# Patient Record
Sex: MALE | Race: WHITE | NOT HISPANIC OR LATINO | Employment: OTHER
[De-identification: names, ages, dates, MRNs, and addresses within clinical notes are randomized per-mention and may not be internally consistent; named-entity substitution may affect disease eponyms.]

---

## 2017-02-22 ENCOUNTER — RECORDS - HEALTHEAST (OUTPATIENT)
Dept: ADMINISTRATIVE | Facility: OTHER | Age: 67
End: 2017-02-22

## 2017-02-23 ENCOUNTER — RECORDS - HEALTHEAST (OUTPATIENT)
Dept: ADMINISTRATIVE | Facility: OTHER | Age: 67
End: 2017-02-23

## 2017-03-29 ENCOUNTER — COMMUNICATION - HEALTHEAST (OUTPATIENT)
Dept: INTERNAL MEDICINE | Facility: CLINIC | Age: 67
End: 2017-03-29

## 2017-03-29 ENCOUNTER — OFFICE VISIT - HEALTHEAST (OUTPATIENT)
Dept: INTERNAL MEDICINE | Facility: CLINIC | Age: 67
End: 2017-03-29

## 2017-03-29 DIAGNOSIS — I10 BENIGN ESSENTIAL HTN: ICD-10-CM

## 2017-03-29 DIAGNOSIS — Z12.5 PROSTATE CANCER SCREENING: ICD-10-CM

## 2017-03-29 DIAGNOSIS — E78.5 HYPERLIPEMIA: ICD-10-CM

## 2017-03-29 DIAGNOSIS — Z00.00 HEALTHCARE MAINTENANCE: ICD-10-CM

## 2017-03-29 LAB
CHOLEST SERPL-MCNC: 255 MG/DL
FASTING STATUS PATIENT QL REPORTED: YES
HDLC SERPL-MCNC: 58 MG/DL
LDLC SERPL CALC-MCNC: 186 MG/DL
PSA SERPL-MCNC: 2.6 NG/ML (ref 0–4.5)
TRIGL SERPL-MCNC: 57 MG/DL

## 2017-03-29 ASSESSMENT — MIFFLIN-ST. JEOR: SCORE: 1765.39

## 2017-04-24 ENCOUNTER — COMMUNICATION - HEALTHEAST (OUTPATIENT)
Dept: SCHEDULING | Facility: CLINIC | Age: 67
End: 2017-04-24

## 2017-04-24 DIAGNOSIS — I10 UNSPECIFIED ESSENTIAL HYPERTENSION: ICD-10-CM

## 2017-09-01 ENCOUNTER — COMMUNICATION - HEALTHEAST (OUTPATIENT)
Dept: INTERNAL MEDICINE | Facility: CLINIC | Age: 67
End: 2017-09-01

## 2017-09-01 DIAGNOSIS — I10 HTN (HYPERTENSION): ICD-10-CM

## 2017-09-01 DIAGNOSIS — I10 UNSPECIFIED ESSENTIAL HYPERTENSION: ICD-10-CM

## 2017-12-21 ENCOUNTER — RECORDS - HEALTHEAST (OUTPATIENT)
Dept: ADMINISTRATIVE | Facility: OTHER | Age: 67
End: 2017-12-21

## 2018-03-29 ENCOUNTER — COMMUNICATION - HEALTHEAST (OUTPATIENT)
Dept: INTERNAL MEDICINE | Facility: CLINIC | Age: 68
End: 2018-03-29

## 2018-03-29 DIAGNOSIS — I10 ESSENTIAL HYPERTENSION: ICD-10-CM

## 2018-03-29 DIAGNOSIS — I10 HTN (HYPERTENSION): ICD-10-CM

## 2018-04-25 ENCOUNTER — COMMUNICATION - HEALTHEAST (OUTPATIENT)
Dept: INTERNAL MEDICINE | Facility: CLINIC | Age: 68
End: 2018-04-25

## 2018-04-25 ENCOUNTER — OFFICE VISIT - HEALTHEAST (OUTPATIENT)
Dept: INTERNAL MEDICINE | Facility: CLINIC | Age: 68
End: 2018-04-25

## 2018-04-25 DIAGNOSIS — E78.5 HYPERLIPEMIA: ICD-10-CM

## 2018-04-25 DIAGNOSIS — Z00.00 HEALTHCARE MAINTENANCE: ICD-10-CM

## 2018-04-25 DIAGNOSIS — Z82.49 FH: HEART DISEASE: ICD-10-CM

## 2018-04-25 DIAGNOSIS — E55.9 VITAMIN D DEFICIENCY: ICD-10-CM

## 2018-04-25 DIAGNOSIS — Z12.5 PROSTATE CANCER SCREENING: ICD-10-CM

## 2018-04-25 DIAGNOSIS — I10 BENIGN ESSENTIAL HTN: ICD-10-CM

## 2018-04-25 LAB
25(OH)D3 SERPL-MCNC: 67.1 NG/ML (ref 30–80)
ALBUMIN SERPL-MCNC: 3.8 G/DL (ref 3.5–5)
ALP SERPL-CCNC: 77 U/L (ref 45–120)
ALT SERPL W P-5'-P-CCNC: 29 U/L (ref 0–45)
ANION GAP SERPL CALCULATED.3IONS-SCNC: 12 MMOL/L (ref 5–18)
AST SERPL W P-5'-P-CCNC: 23 U/L (ref 0–40)
BILIRUB SERPL-MCNC: 0.6 MG/DL (ref 0–1)
BUN SERPL-MCNC: 30 MG/DL (ref 8–22)
CALCIUM SERPL-MCNC: 9.9 MG/DL (ref 8.5–10.5)
CHLORIDE BLD-SCNC: 104 MMOL/L (ref 98–107)
CHOLEST SERPL-MCNC: 283 MG/DL
CO2 SERPL-SCNC: 25 MMOL/L (ref 22–31)
CREAT SERPL-MCNC: 1.41 MG/DL (ref 0.7–1.3)
ERYTHROCYTE [DISTWIDTH] IN BLOOD BY AUTOMATED COUNT: 12.5 % (ref 11–14.5)
FASTING STATUS PATIENT QL REPORTED: YES
GFR SERPL CREATININE-BSD FRML MDRD: 50 ML/MIN/1.73M2
GLUCOSE BLD-MCNC: 88 MG/DL (ref 70–125)
HCT VFR BLD AUTO: 43.6 % (ref 40–54)
HDLC SERPL-MCNC: 61 MG/DL
HGB BLD-MCNC: 14.9 G/DL (ref 14–18)
LDLC SERPL CALC-MCNC: 212 MG/DL
MCH RBC QN AUTO: 31.1 PG (ref 27–34)
MCHC RBC AUTO-ENTMCNC: 34.1 G/DL (ref 32–36)
MCV RBC AUTO: 91 FL (ref 80–100)
PLATELET # BLD AUTO: 192 THOU/UL (ref 140–440)
PMV BLD AUTO: 9.1 FL (ref 7–10)
POTASSIUM BLD-SCNC: 4.3 MMOL/L (ref 3.5–5)
PROT SERPL-MCNC: 7.2 G/DL (ref 6–8)
PSA SERPL-MCNC: 3.4 NG/ML (ref 0–4.5)
RBC # BLD AUTO: 4.78 MILL/UL (ref 4.4–6.2)
SODIUM SERPL-SCNC: 141 MMOL/L (ref 136–145)
TRIGL SERPL-MCNC: 48 MG/DL
TSH SERPL DL<=0.005 MIU/L-ACNC: 1.58 UIU/ML (ref 0.3–5)
WBC: 6.7 THOU/UL (ref 4–11)

## 2018-04-25 ASSESSMENT — MIFFLIN-ST. JEOR: SCORE: 1743.96

## 2018-05-03 ENCOUNTER — HOSPITAL ENCOUNTER (OUTPATIENT)
Dept: CT IMAGING | Facility: CLINIC | Age: 68
Discharge: HOME | End: 2018-05-03
Attending: INTERNAL MEDICINE

## 2018-05-03 ENCOUNTER — COMMUNICATION - HEALTHEAST (OUTPATIENT)
Dept: INTERNAL MEDICINE | Facility: CLINIC | Age: 68
End: 2018-05-03

## 2018-05-03 DIAGNOSIS — E78.5 HYPERLIPEMIA: ICD-10-CM

## 2018-05-03 DIAGNOSIS — Z82.49 FH: HEART DISEASE: ICD-10-CM

## 2018-05-03 LAB
CV CALCIUM SCORE AGATSTON LM: 1
CV CALCIUM SCORING AGATSON LAD: 0
CV CALCIUM SCORING AGATSTON CX: 0
CV CALCIUM SCORING AGATSTON RCA: 0
CV CALCIUM SCORING AGATSTON TOTAL: 1

## 2018-05-04 ENCOUNTER — COMMUNICATION - HEALTHEAST (OUTPATIENT)
Dept: INTERNAL MEDICINE | Facility: CLINIC | Age: 68
End: 2018-05-04

## 2018-08-14 ENCOUNTER — COMMUNICATION - HEALTHEAST (OUTPATIENT)
Dept: INTERNAL MEDICINE | Facility: CLINIC | Age: 68
End: 2018-08-14

## 2018-08-14 DIAGNOSIS — I10 ESSENTIAL HYPERTENSION: ICD-10-CM

## 2018-08-15 ENCOUNTER — COMMUNICATION - HEALTHEAST (OUTPATIENT)
Dept: INTERNAL MEDICINE | Facility: CLINIC | Age: 68
End: 2018-08-15

## 2018-08-15 DIAGNOSIS — I10 HTN (HYPERTENSION): ICD-10-CM

## 2018-11-20 ENCOUNTER — COMMUNICATION - HEALTHEAST (OUTPATIENT)
Dept: INTERNAL MEDICINE | Facility: CLINIC | Age: 68
End: 2018-11-20

## 2018-11-20 DIAGNOSIS — I10 ESSENTIAL HYPERTENSION: ICD-10-CM

## 2018-11-20 DIAGNOSIS — I10 HTN (HYPERTENSION): ICD-10-CM

## 2019-01-17 ENCOUNTER — OFFICE VISIT - HEALTHEAST (OUTPATIENT)
Dept: INTERNAL MEDICINE | Facility: CLINIC | Age: 69
End: 2019-01-17

## 2019-01-17 DIAGNOSIS — E55.9 MILD VITAMIN D DEFICIENCY: ICD-10-CM

## 2019-01-17 DIAGNOSIS — Z00.00 HEALTHCARE MAINTENANCE: ICD-10-CM

## 2019-01-17 DIAGNOSIS — Z12.5 SCREENING FOR PROSTATE CANCER: ICD-10-CM

## 2019-01-17 DIAGNOSIS — E78.5 HYPERLIPIDEMIA, UNSPECIFIED HYPERLIPIDEMIA TYPE: ICD-10-CM

## 2019-01-17 LAB
ALBUMIN SERPL-MCNC: 3.8 G/DL (ref 3.5–5)
ALP SERPL-CCNC: 64 U/L (ref 45–120)
ALT SERPL W P-5'-P-CCNC: 24 U/L (ref 0–45)
ANION GAP SERPL CALCULATED.3IONS-SCNC: 10 MMOL/L (ref 5–18)
AST SERPL W P-5'-P-CCNC: 19 U/L (ref 0–40)
BILIRUB SERPL-MCNC: 1.1 MG/DL (ref 0–1)
BUN SERPL-MCNC: 33 MG/DL (ref 8–28)
CALCIUM SERPL-MCNC: 9.8 MG/DL (ref 8.5–10.5)
CHLORIDE BLD-SCNC: 104 MMOL/L (ref 98–107)
CHOLEST SERPL-MCNC: 267 MG/DL
CO2 SERPL-SCNC: 26 MMOL/L (ref 22–31)
CREAT SERPL-MCNC: 1.2 MG/DL (ref 0.7–1.3)
ERYTHROCYTE [DISTWIDTH] IN BLOOD BY AUTOMATED COUNT: 12.4 % (ref 11–14.5)
FASTING STATUS PATIENT QL REPORTED: YES
GFR SERPL CREATININE-BSD FRML MDRD: 60 ML/MIN/1.73M2
GLUCOSE BLD-MCNC: 93 MG/DL (ref 70–125)
HCT VFR BLD AUTO: 43.4 % (ref 40–54)
HDLC SERPL-MCNC: 61 MG/DL
HGB BLD-MCNC: 14.5 G/DL (ref 14–18)
LDLC SERPL CALC-MCNC: 194 MG/DL
MCH RBC QN AUTO: 30.4 PG (ref 27–34)
MCHC RBC AUTO-ENTMCNC: 33.5 G/DL (ref 32–36)
MCV RBC AUTO: 91 FL (ref 80–100)
PLATELET # BLD AUTO: 200 THOU/UL (ref 140–440)
PMV BLD AUTO: 9.2 FL (ref 7–10)
POTASSIUM BLD-SCNC: 3.9 MMOL/L (ref 3.5–5)
PROT SERPL-MCNC: 6.8 G/DL (ref 6–8)
PSA SERPL-MCNC: 2.8 NG/ML (ref 0–6.5)
RBC # BLD AUTO: 4.79 MILL/UL (ref 4.4–6.2)
SODIUM SERPL-SCNC: 140 MMOL/L (ref 136–145)
TRIGL SERPL-MCNC: 59 MG/DL
TSH SERPL DL<=0.005 MIU/L-ACNC: 1.74 UIU/ML (ref 0.3–5)
WBC: 5.1 THOU/UL (ref 4–11)

## 2019-01-17 ASSESSMENT — MIFFLIN-ST. JEOR: SCORE: 1713.34

## 2019-01-18 ENCOUNTER — COMMUNICATION - HEALTHEAST (OUTPATIENT)
Dept: INTERNAL MEDICINE | Facility: CLINIC | Age: 69
End: 2019-01-18

## 2019-01-18 LAB — 25(OH)D3 SERPL-MCNC: 53.2 NG/ML (ref 30–80)

## 2019-02-05 ENCOUNTER — COMMUNICATION - HEALTHEAST (OUTPATIENT)
Dept: INTERNAL MEDICINE | Facility: CLINIC | Age: 69
End: 2019-02-05

## 2019-05-09 ENCOUNTER — COMMUNICATION - HEALTHEAST (OUTPATIENT)
Dept: INTERNAL MEDICINE | Facility: CLINIC | Age: 69
End: 2019-05-09

## 2019-05-09 DIAGNOSIS — I10 ESSENTIAL HYPERTENSION: ICD-10-CM

## 2019-07-31 ENCOUNTER — COMMUNICATION - HEALTHEAST (OUTPATIENT)
Dept: INTERNAL MEDICINE | Facility: CLINIC | Age: 69
End: 2019-07-31

## 2019-07-31 DIAGNOSIS — I10 HTN (HYPERTENSION): ICD-10-CM

## 2020-02-16 ENCOUNTER — COMMUNICATION - HEALTHEAST (OUTPATIENT)
Dept: INTERNAL MEDICINE | Facility: CLINIC | Age: 70
End: 2020-02-16

## 2020-02-16 DIAGNOSIS — I10 HTN (HYPERTENSION): ICD-10-CM

## 2020-03-02 ENCOUNTER — COMMUNICATION - HEALTHEAST (OUTPATIENT)
Dept: INTERNAL MEDICINE | Facility: CLINIC | Age: 70
End: 2020-03-02

## 2020-03-02 ENCOUNTER — OFFICE VISIT - HEALTHEAST (OUTPATIENT)
Dept: INTERNAL MEDICINE | Facility: CLINIC | Age: 70
End: 2020-03-02

## 2020-03-02 DIAGNOSIS — E78.5 HYPERLIPIDEMIA LDL GOAL <100: ICD-10-CM

## 2020-03-02 DIAGNOSIS — I10 HTN (HYPERTENSION): ICD-10-CM

## 2020-03-02 DIAGNOSIS — I10 ESSENTIAL HYPERTENSION: ICD-10-CM

## 2020-03-02 DIAGNOSIS — D12.6 ADENOMATOUS POLYP OF COLON, UNSPECIFIED PART OF COLON: ICD-10-CM

## 2020-03-02 DIAGNOSIS — Z12.5 SCREENING FOR PROSTATE CANCER: ICD-10-CM

## 2020-03-02 LAB
ALBUMIN SERPL-MCNC: 3.8 G/DL (ref 3.5–5)
ALP SERPL-CCNC: 74 U/L (ref 45–120)
ALT SERPL W P-5'-P-CCNC: 21 U/L (ref 0–45)
ANION GAP SERPL CALCULATED.3IONS-SCNC: 11 MMOL/L (ref 5–18)
AST SERPL W P-5'-P-CCNC: 17 U/L (ref 0–40)
BASOPHILS # BLD AUTO: 0 THOU/UL (ref 0–0.2)
BASOPHILS NFR BLD AUTO: 1 % (ref 0–2)
BILIRUB SERPL-MCNC: 0.7 MG/DL (ref 0–1)
BUN SERPL-MCNC: 25 MG/DL (ref 8–28)
CALCIUM SERPL-MCNC: 9.6 MG/DL (ref 8.5–10.5)
CHLORIDE BLD-SCNC: 104 MMOL/L (ref 98–107)
CHOLEST SERPL-MCNC: 295 MG/DL
CO2 SERPL-SCNC: 26 MMOL/L (ref 22–31)
CREAT SERPL-MCNC: 1.1 MG/DL (ref 0.7–1.3)
EOSINOPHIL # BLD AUTO: 0.1 THOU/UL (ref 0–0.4)
EOSINOPHIL NFR BLD AUTO: 2 % (ref 0–6)
ERYTHROCYTE [DISTWIDTH] IN BLOOD BY AUTOMATED COUNT: 13.3 % (ref 11–14.5)
FASTING STATUS PATIENT QL REPORTED: YES
GFR SERPL CREATININE-BSD FRML MDRD: >60 ML/MIN/1.73M2
GLUCOSE BLD-MCNC: 96 MG/DL (ref 70–125)
HCT VFR BLD AUTO: 42.1 % (ref 40–54)
HDLC SERPL-MCNC: 59 MG/DL
HGB BLD-MCNC: 14.2 G/DL (ref 14–18)
LDLC SERPL CALC-MCNC: 220 MG/DL
LYMPHOCYTES # BLD AUTO: 1.2 THOU/UL (ref 0.8–4.4)
LYMPHOCYTES NFR BLD AUTO: 24 % (ref 20–40)
MCH RBC QN AUTO: 30.3 PG (ref 27–34)
MCHC RBC AUTO-ENTMCNC: 33.7 G/DL (ref 32–36)
MCV RBC AUTO: 90 FL (ref 80–100)
MONOCYTES # BLD AUTO: 0.6 THOU/UL (ref 0–0.9)
MONOCYTES NFR BLD AUTO: 11 % (ref 2–10)
NEUTROPHILS # BLD AUTO: 3 THOU/UL (ref 2–7.7)
NEUTROPHILS NFR BLD AUTO: 62 % (ref 50–70)
PLATELET # BLD AUTO: 185 THOU/UL (ref 140–440)
PMV BLD AUTO: 12.3 FL (ref 8.5–12.5)
POTASSIUM BLD-SCNC: 4 MMOL/L (ref 3.5–5)
PROT SERPL-MCNC: 6.9 G/DL (ref 6–8)
PSA SERPL-MCNC: 2.7 NG/ML (ref 0–6.5)
RBC # BLD AUTO: 4.68 MILL/UL (ref 4.4–6.2)
SODIUM SERPL-SCNC: 141 MMOL/L (ref 136–145)
TRIGL SERPL-MCNC: 78 MG/DL
WBC: 5 THOU/UL (ref 4–11)

## 2020-03-06 ENCOUNTER — OFFICE VISIT - HEALTHEAST (OUTPATIENT)
Dept: PHARMACY | Facility: CLINIC | Age: 70
End: 2020-03-06

## 2020-03-06 DIAGNOSIS — I10 BENIGN ESSENTIAL HTN: ICD-10-CM

## 2020-03-06 DIAGNOSIS — E78.5 HYPERLIPIDEMIA, UNSPECIFIED HYPERLIPIDEMIA TYPE: ICD-10-CM

## 2020-03-06 DIAGNOSIS — Z78.9 TAKES DIETARY SUPPLEMENTS: ICD-10-CM

## 2020-03-06 RX ORDER — GLUCOSAMINE HCL 500 MG
100 TABLET ORAL DAILY
Status: SHIPPED | COMMUNITY
Start: 2020-03-06 | End: 2023-09-09

## 2020-03-06 RX ORDER — CHLORAL HYDRATE 500 MG
1 CAPSULE ORAL DAILY
Status: SHIPPED | COMMUNITY
Start: 2020-03-06 | End: 2023-09-09

## 2020-03-06 RX ORDER — LACTOBACILLUS RHAMNOSUS GG 15B CELL
1 CAPSULE, SPRINKLE ORAL DAILY
Status: SHIPPED | COMMUNITY
Start: 2020-03-06 | End: 2023-09-09

## 2020-03-06 RX ORDER — ZINC GLUCONATE 50 MG
50 TABLET ORAL DAILY
Status: SHIPPED | COMMUNITY
Start: 2020-03-06 | End: 2023-09-09

## 2020-03-11 ENCOUNTER — COMMUNICATION - HEALTHEAST (OUTPATIENT)
Dept: INTERNAL MEDICINE | Facility: CLINIC | Age: 70
End: 2020-03-11

## 2020-03-11 DIAGNOSIS — E78.5 HYPERLIPIDEMIA, UNSPECIFIED HYPERLIPIDEMIA TYPE: ICD-10-CM

## 2020-03-11 DIAGNOSIS — Z78.9 STATIN INTOLERANCE: ICD-10-CM

## 2020-03-11 DIAGNOSIS — I10 BENIGN ESSENTIAL HTN: ICD-10-CM

## 2020-03-23 ENCOUNTER — COMMUNICATION - HEALTHEAST (OUTPATIENT)
Dept: INTERNAL MEDICINE | Facility: CLINIC | Age: 70
End: 2020-03-23

## 2020-03-27 ENCOUNTER — COMMUNICATION - HEALTHEAST (OUTPATIENT)
Dept: NURSING | Facility: CLINIC | Age: 70
End: 2020-03-27

## 2020-05-14 ENCOUNTER — COMMUNICATION - HEALTHEAST (OUTPATIENT)
Dept: INTERNAL MEDICINE | Facility: CLINIC | Age: 70
End: 2020-05-14

## 2020-05-19 ENCOUNTER — COMMUNICATION - HEALTHEAST (OUTPATIENT)
Dept: INTERNAL MEDICINE | Facility: CLINIC | Age: 70
End: 2020-05-19

## 2020-05-19 DIAGNOSIS — I10 ESSENTIAL HYPERTENSION: ICD-10-CM

## 2020-05-20 RX ORDER — HYDROCHLOROTHIAZIDE 12.5 MG/1
CAPSULE ORAL
Qty: 90 CAPSULE | Refills: 3 | Status: SHIPPED | OUTPATIENT
Start: 2020-05-20

## 2020-07-07 ENCOUNTER — COMMUNICATION - HEALTHEAST (OUTPATIENT)
Dept: INTERNAL MEDICINE | Facility: CLINIC | Age: 70
End: 2020-07-07

## 2020-07-07 DIAGNOSIS — I10 HTN (HYPERTENSION): ICD-10-CM

## 2020-07-07 DIAGNOSIS — I10 BENIGN ESSENTIAL HTN: ICD-10-CM

## 2020-07-07 RX ORDER — VALSARTAN 160 MG/1
160 TABLET ORAL DAILY
Qty: 30 TABLET | Refills: 11 | Status: SHIPPED | OUTPATIENT
Start: 2020-07-07 | End: 2023-09-09

## 2020-09-09 ENCOUNTER — COMMUNICATION - HEALTHEAST (OUTPATIENT)
Dept: INTERNAL MEDICINE | Facility: CLINIC | Age: 70
End: 2020-09-09

## 2020-09-09 DIAGNOSIS — M54.30 SCIATICA, UNSPECIFIED LATERALITY: ICD-10-CM

## 2020-09-14 ENCOUNTER — OFFICE VISIT - HEALTHEAST (OUTPATIENT)
Dept: INTERNAL MEDICINE | Facility: CLINIC | Age: 70
End: 2020-09-14

## 2020-09-14 DIAGNOSIS — M54.16 LUMBAR RADICULOPATHY: ICD-10-CM

## 2020-09-14 DIAGNOSIS — I10 BENIGN ESSENTIAL HTN: ICD-10-CM

## 2020-09-14 DIAGNOSIS — I77.89 OTHER SPECIFIED DISORDERS OF ARTERIES AND ARTERIOLES (H): ICD-10-CM

## 2020-09-14 DIAGNOSIS — E78.5 HYPERLIPIDEMIA, UNSPECIFIED HYPERLIPIDEMIA TYPE: ICD-10-CM

## 2020-09-14 LAB
ANION GAP SERPL CALCULATED.3IONS-SCNC: 8 MMOL/L (ref 5–18)
BASOPHILS # BLD AUTO: 0 THOU/UL (ref 0–0.2)
BASOPHILS NFR BLD AUTO: 1 % (ref 0–2)
BUN SERPL-MCNC: 21 MG/DL (ref 8–28)
CALCIUM SERPL-MCNC: 9.4 MG/DL (ref 8.5–10.5)
CHLORIDE BLD-SCNC: 103 MMOL/L (ref 98–107)
CHOLEST SERPL-MCNC: 185 MG/DL
CO2 SERPL-SCNC: 29 MMOL/L (ref 22–31)
CREAT SERPL-MCNC: 1.04 MG/DL (ref 0.7–1.3)
EOSINOPHIL # BLD AUTO: 0.2 THOU/UL (ref 0–0.4)
EOSINOPHIL NFR BLD AUTO: 3 % (ref 0–6)
ERYTHROCYTE [DISTWIDTH] IN BLOOD BY AUTOMATED COUNT: 13 % (ref 11–14.5)
FASTING STATUS PATIENT QL REPORTED: YES
GFR SERPL CREATININE-BSD FRML MDRD: >60 ML/MIN/1.73M2
GLUCOSE BLD-MCNC: 93 MG/DL (ref 70–125)
HCT VFR BLD AUTO: 43 % (ref 40–54)
HDLC SERPL-MCNC: 51 MG/DL
HGB BLD-MCNC: 14.5 G/DL (ref 14–18)
LDLC SERPL CALC-MCNC: 120 MG/DL
LYMPHOCYTES # BLD AUTO: 1.5 THOU/UL (ref 0.8–4.4)
LYMPHOCYTES NFR BLD AUTO: 28 % (ref 20–40)
MCH RBC QN AUTO: 31 PG (ref 27–34)
MCHC RBC AUTO-ENTMCNC: 33.6 G/DL (ref 32–36)
MCV RBC AUTO: 92 FL (ref 80–100)
MONOCYTES # BLD AUTO: 0.5 THOU/UL (ref 0–0.9)
MONOCYTES NFR BLD AUTO: 9 % (ref 2–10)
NEUTROPHILS # BLD AUTO: 3.2 THOU/UL (ref 2–7.7)
NEUTROPHILS NFR BLD AUTO: 60 % (ref 50–70)
PLATELET # BLD AUTO: 193 THOU/UL (ref 140–440)
PMV BLD AUTO: 9.3 FL (ref 7–10)
POTASSIUM BLD-SCNC: 3.6 MMOL/L (ref 3.5–5)
RBC # BLD AUTO: 4.67 MILL/UL (ref 4.4–6.2)
SODIUM SERPL-SCNC: 140 MMOL/L (ref 136–145)
TRIGL SERPL-MCNC: 68 MG/DL
WBC: 5.3 THOU/UL (ref 4–11)

## 2020-09-14 RX ORDER — AMLODIPINE BESYLATE 10 MG/1
10 TABLET ORAL DAILY
Qty: 90 TABLET | Refills: 3 | Status: SHIPPED | OUTPATIENT
Start: 2020-09-14 | End: 2023-09-09

## 2020-09-14 ASSESSMENT — MIFFLIN-ST. JEOR: SCORE: 1763.23

## 2020-09-15 ENCOUNTER — COMMUNICATION - HEALTHEAST (OUTPATIENT)
Dept: INTERNAL MEDICINE | Facility: CLINIC | Age: 70
End: 2020-09-15

## 2020-09-30 ENCOUNTER — HOSPITAL ENCOUNTER (OUTPATIENT)
Dept: MRI IMAGING | Facility: CLINIC | Age: 70
Discharge: HOME | End: 2020-09-30
Attending: INTERNAL MEDICINE

## 2020-09-30 DIAGNOSIS — M54.16 LUMBAR RADICULOPATHY: ICD-10-CM

## 2020-10-01 ENCOUNTER — COMMUNICATION - HEALTHEAST (OUTPATIENT)
Dept: INTERNAL MEDICINE | Facility: CLINIC | Age: 70
End: 2020-10-01

## 2020-10-01 DIAGNOSIS — M48.062 SPINAL STENOSIS OF LUMBAR REGION WITH NEUROGENIC CLAUDICATION: ICD-10-CM

## 2020-10-05 ENCOUNTER — HOSPITAL ENCOUNTER (OUTPATIENT)
Dept: PHYSICAL MEDICINE AND REHAB | Facility: CLINIC | Age: 70
Discharge: HOME | End: 2020-10-05
Attending: INTERNAL MEDICINE

## 2020-10-05 DIAGNOSIS — M43.17 SPONDYLOLISTHESIS OF LUMBOSACRAL REGION: ICD-10-CM

## 2020-10-05 DIAGNOSIS — M54.16 LUMBAR RADICULAR PAIN: ICD-10-CM

## 2020-10-05 ASSESSMENT — MIFFLIN-ST. JEOR: SCORE: 1763.23

## 2020-10-14 ENCOUNTER — OFFICE VISIT - HEALTHEAST (OUTPATIENT)
Dept: PHYSICAL THERAPY | Facility: REHABILITATION | Age: 70
End: 2020-10-14

## 2020-10-14 DIAGNOSIS — M54.41 CHRONIC RIGHT-SIDED LOW BACK PAIN WITH RIGHT-SIDED SCIATICA: ICD-10-CM

## 2020-10-14 DIAGNOSIS — G89.29 CHRONIC RIGHT-SIDED LOW BACK PAIN WITH RIGHT-SIDED SCIATICA: ICD-10-CM

## 2020-10-14 DIAGNOSIS — M62.89 MUSCLE TIGHTNESS: ICD-10-CM

## 2020-10-14 DIAGNOSIS — M62.81 GENERALIZED MUSCLE WEAKNESS: ICD-10-CM

## 2020-10-19 ENCOUNTER — HOSPITAL ENCOUNTER (OUTPATIENT)
Dept: PHYSICAL MEDICINE AND REHAB | Facility: CLINIC | Age: 70
Discharge: HOME | End: 2020-10-19
Attending: PHYSICIAN ASSISTANT

## 2020-10-19 DIAGNOSIS — M54.16 LUMBAR RADICULAR PAIN: ICD-10-CM

## 2020-11-02 ENCOUNTER — HOSPITAL ENCOUNTER (OUTPATIENT)
Dept: PHYSICAL MEDICINE AND REHAB | Facility: CLINIC | Age: 70
Discharge: HOME | End: 2020-11-02
Attending: PHYSICIAN ASSISTANT

## 2020-11-02 DIAGNOSIS — M43.17 SPONDYLOLISTHESIS OF LUMBOSACRAL REGION: ICD-10-CM

## 2020-11-02 DIAGNOSIS — M54.16 LUMBAR RADICULAR PAIN: ICD-10-CM

## 2020-11-02 ASSESSMENT — MIFFLIN-ST. JEOR: SCORE: 1763.23

## 2020-11-10 ENCOUNTER — COMMUNICATION - HEALTHEAST (OUTPATIENT)
Dept: PHYSICAL MEDICINE AND REHAB | Facility: CLINIC | Age: 70
End: 2020-11-10

## 2020-11-11 ENCOUNTER — COMMUNICATION - HEALTHEAST (OUTPATIENT)
Dept: PHYSICAL MEDICINE AND REHAB | Facility: CLINIC | Age: 70
End: 2020-11-11

## 2020-11-11 DIAGNOSIS — M54.16 LUMBAR RADICULOPATHY: ICD-10-CM

## 2020-11-19 ENCOUNTER — HOSPITAL ENCOUNTER (OUTPATIENT)
Dept: PHYSICAL MEDICINE AND REHAB | Facility: CLINIC | Age: 70
Discharge: HOME | End: 2020-11-19
Attending: PHYSICIAN ASSISTANT

## 2020-11-19 DIAGNOSIS — M54.16 LUMBAR RADICULOPATHY: ICD-10-CM

## 2020-11-21 ENCOUNTER — COMMUNICATION - HEALTHEAST (OUTPATIENT)
Dept: INTERNAL MEDICINE | Facility: CLINIC | Age: 70
End: 2020-11-21

## 2020-11-22 ENCOUNTER — COMMUNICATION - HEALTHEAST (OUTPATIENT)
Dept: INTERNAL MEDICINE | Facility: CLINIC | Age: 70
End: 2020-11-22

## 2020-11-23 ENCOUNTER — COMMUNICATION - HEALTHEAST (OUTPATIENT)
Dept: PHYSICAL MEDICINE AND REHAB | Facility: CLINIC | Age: 70
End: 2020-11-23

## 2020-11-23 ENCOUNTER — HOSPITAL ENCOUNTER (OUTPATIENT)
Dept: PHYSICAL MEDICINE AND REHAB | Facility: CLINIC | Age: 70
Discharge: HOME | End: 2020-11-23
Attending: PHYSICIAN ASSISTANT

## 2020-11-23 DIAGNOSIS — M54.16 LUMBAR RADICULOPATHY: ICD-10-CM

## 2020-11-23 DIAGNOSIS — M43.17 SPONDYLOLISTHESIS OF LUMBOSACRAL REGION: ICD-10-CM

## 2020-11-24 ENCOUNTER — HOSPITAL ENCOUNTER (OUTPATIENT)
Dept: RADIOLOGY | Facility: CLINIC | Age: 70
Discharge: HOME | End: 2020-11-24
Attending: PHYSICIAN ASSISTANT

## 2020-11-24 ENCOUNTER — COMMUNICATION - HEALTHEAST (OUTPATIENT)
Dept: PHYSICAL MEDICINE AND REHAB | Facility: CLINIC | Age: 70
End: 2020-11-24

## 2020-11-24 DIAGNOSIS — M43.17 SPONDYLOLISTHESIS OF LUMBOSACRAL REGION: ICD-10-CM

## 2020-12-01 ENCOUNTER — COMMUNICATION - HEALTHEAST (OUTPATIENT)
Dept: INTERNAL MEDICINE | Facility: CLINIC | Age: 70
End: 2020-12-01

## 2020-12-01 ENCOUNTER — OFFICE VISIT - HEALTHEAST (OUTPATIENT)
Dept: NEUROSURGERY | Facility: CLINIC | Age: 70
End: 2020-12-01

## 2020-12-01 DIAGNOSIS — M54.16 LUMBAR RADICULOPATHY: ICD-10-CM

## 2020-12-01 RX ORDER — TRAMADOL HYDROCHLORIDE 50 MG/1
50 TABLET ORAL EVERY 8 HOURS PRN
Qty: 10 TABLET | Refills: 0 | Status: SHIPPED | OUTPATIENT
Start: 2020-12-01 | End: 2023-09-09

## 2020-12-01 ASSESSMENT — MIFFLIN-ST. JEOR: SCORE: 1763.23

## 2020-12-03 ENCOUNTER — HOSPITAL ENCOUNTER (OUTPATIENT)
Dept: CT IMAGING | Facility: CLINIC | Age: 70
Discharge: HOME | End: 2020-12-03
Attending: SURGERY

## 2020-12-03 ENCOUNTER — OFFICE VISIT - HEALTHEAST (OUTPATIENT)
Dept: INTERNAL MEDICINE | Facility: CLINIC | Age: 70
End: 2020-12-03

## 2020-12-03 DIAGNOSIS — N28.9 DISORDER OF KIDNEY AND URETER: ICD-10-CM

## 2020-12-03 DIAGNOSIS — I10 BENIGN ESSENTIAL HTN: ICD-10-CM

## 2020-12-03 DIAGNOSIS — M54.16 LUMBAR RADICULOPATHY: ICD-10-CM

## 2020-12-08 ENCOUNTER — COMMUNICATION - HEALTHEAST (OUTPATIENT)
Dept: INTERNAL MEDICINE | Facility: CLINIC | Age: 70
End: 2020-12-08

## 2020-12-19 ENCOUNTER — COMMUNICATION - HEALTHEAST (OUTPATIENT)
Dept: INTERNAL MEDICINE | Facility: CLINIC | Age: 70
End: 2020-12-19

## 2020-12-19 DIAGNOSIS — I10 BENIGN ESSENTIAL HTN: ICD-10-CM

## 2021-02-13 ENCOUNTER — COMMUNICATION - HEALTHEAST (OUTPATIENT)
Dept: INTERNAL MEDICINE | Facility: CLINIC | Age: 71
End: 2021-02-13

## 2021-02-15 ENCOUNTER — COMMUNICATION - HEALTHEAST (OUTPATIENT)
Dept: INTERNAL MEDICINE | Facility: CLINIC | Age: 71
End: 2021-02-15

## 2021-03-01 ENCOUNTER — COMMUNICATION - HEALTHEAST (OUTPATIENT)
Dept: FAMILY MEDICINE | Facility: CLINIC | Age: 71
End: 2021-03-01

## 2021-03-01 DIAGNOSIS — Z78.9 STATIN INTOLERANCE: ICD-10-CM

## 2021-03-01 DIAGNOSIS — E78.5 HYPERLIPIDEMIA, UNSPECIFIED HYPERLIPIDEMIA TYPE: ICD-10-CM

## 2021-03-01 RX ORDER — EVOLOCUMAB 140 MG/ML
140 INJECTION, SOLUTION SUBCUTANEOUS
Qty: 6 ML | Refills: 3 | Status: SHIPPED | OUTPATIENT
Start: 2021-03-01 | End: 2022-01-20

## 2021-03-02 ENCOUNTER — COMMUNICATION - HEALTHEAST (OUTPATIENT)
Dept: INTERNAL MEDICINE | Facility: CLINIC | Age: 71
End: 2021-03-02

## 2021-05-26 ENCOUNTER — RECORDS - HEALTHEAST (OUTPATIENT)
Dept: ADMINISTRATIVE | Facility: CLINIC | Age: 71
End: 2021-05-26

## 2021-05-28 NOTE — TELEPHONE ENCOUNTER
Medication Question or Clarification  Who is calling: Pharmacy: [facility]  What medication are you calling about? (include dose and sig) losartan-HCTZ 50-12.5 mg daily  Who prescribed the medication?: Daniel Gann MD  What is your question/concern?: This medication is currently on backorder and the pharmacist is requesting to split this prescriptions into the separate drugs.  Pharmacy: [facility] #[id number]  Okay to leave a detailed message?: No  Site CMT - Please call the pharmacy to obtain any additional needed information.

## 2021-05-30 ENCOUNTER — HEALTH MAINTENANCE LETTER (OUTPATIENT)
Age: 71
End: 2021-05-30

## 2021-05-30 ENCOUNTER — RECORDS - HEALTHEAST (OUTPATIENT)
Dept: ADMINISTRATIVE | Facility: CLINIC | Age: 71
End: 2021-05-30

## 2021-05-30 VITALS — HEIGHT: 72 IN | BODY MASS INDEX: 29.07 KG/M2 | WEIGHT: 214.6 LBS

## 2021-05-31 NOTE — TELEPHONE ENCOUNTER
Former patient of Sanjuanita & has not established care with another provider.  Please assign refill request to covering provider per Clinic standard process.      Refill Approved    Rx renewed per Medication Renewal Policy. Medication was last renewed on 11/22/18.    Emmie Smith, [facility] Connection Triage/Med Refill 7/31/2019     Requested Prescriptions   Pending Prescriptions Disp Refills     spironolactone (ALDACTONE) 25 MG tablet [Pharmacy Med Name: SPIRONOLACTONE 25 MG TABLET] 90 tablet 0     Sig: TAKE 1 TABLET BY MOUTH EVERY DAY       Diuretics/Combination Diuretics Refill Protocol  Passed - 7/31/2019 10:15 AM        Passed - Visit with PCP or prescribing provider visit in past 12 months     Last office visit with prescriber/PCP: Visit date not found OR same dept: Visit date not found OR same specialty: Visit date not found  Last physical: 1/17/2019 Last MTM visit: Visit date not found   Next visit within 3 mo: Visit date not found  Next physical within 3 mo: Visit date not found  Prescriber OR PCP: Daniel Gann MD  Last diagnosis associated with med order: 1. HTN (hypertension)  - spironolactone (ALDACTONE) 25 MG tablet [Pharmacy Med Name: SPIRONOLACTONE 25 MG TABLET]; TAKE 1 TABLET BY MOUTH EVERY DAY  Dispense: 90 tablet; Refill: 0    If protocol passes may refill for 12 months if within 3 months of last provider visit (or a total of 15 months).             Passed - Serum Potassium in past 12 months      Lab Results   Component Value Date    Potassium 3.9 01/17/2019             Passed - Serum Sodium in past 12 months      Lab Results   Component Value Date    Sodium 140 01/17/2019             Passed - Blood pressure on file in past 12 months     BP Readings from Last 1 Encounters:   01/17/19 140/86             Passed - Serum Creatinine in past 12 months      Creatinine   Date Value Ref Range Status   01/17/2019 1.20 0.70 - 1.30 mg/dL Final

## 2021-06-01 VITALS — WEIGHT: 209 LBS | HEIGHT: 73 IN | BODY MASS INDEX: 27.7 KG/M2

## 2021-06-02 VITALS — BODY MASS INDEX: 27.63 KG/M2 | HEIGHT: 72 IN | WEIGHT: 204 LBS

## 2021-06-04 VITALS
BODY MASS INDEX: 29.43 KG/M2 | WEIGHT: 217 LBS | OXYGEN SATURATION: 97 % | DIASTOLIC BLOOD PRESSURE: 88 MMHG | RESPIRATION RATE: 16 BRPM | SYSTOLIC BLOOD PRESSURE: 132 MMHG | HEART RATE: 61 BPM

## 2021-06-04 VITALS
HEART RATE: 58 BPM | DIASTOLIC BLOOD PRESSURE: 80 MMHG | SYSTOLIC BLOOD PRESSURE: 152 MMHG | WEIGHT: 217 LBS | BODY MASS INDEX: 29.43 KG/M2

## 2021-06-05 VITALS
HEIGHT: 72 IN | OXYGEN SATURATION: 98 % | WEIGHT: 215 LBS | HEART RATE: 50 BPM | SYSTOLIC BLOOD PRESSURE: 158 MMHG | DIASTOLIC BLOOD PRESSURE: 92 MMHG | BODY MASS INDEX: 29.12 KG/M2

## 2021-06-05 VITALS
WEIGHT: 215 LBS | OXYGEN SATURATION: 97 % | HEART RATE: 62 BPM | HEIGHT: 72 IN | DIASTOLIC BLOOD PRESSURE: 92 MMHG | BODY MASS INDEX: 29.12 KG/M2 | SYSTOLIC BLOOD PRESSURE: 168 MMHG | RESPIRATION RATE: 16 BRPM

## 2021-06-05 VITALS — BODY MASS INDEX: 29.12 KG/M2 | WEIGHT: 215 LBS | HEIGHT: 72 IN

## 2021-06-06 NOTE — TELEPHONE ENCOUNTER
Central PA team  [phone]  Pool: HE PA MED ([id number])          PA has been initiated.       PA form completed and faxed insurance via Cover My Meds     Key:  [id number]     Medication:  Praluent 75MG/ML auto-injectors    Insurance:  [place]        Response will be received via fax and may take up to 5-10 business days depending on plan

## 2021-06-06 NOTE — PROGRESS NOTES
MTM Initial Encounter  Assessment & Plan                                                     1. Hyperlipidemia, unspecified hyperlipidemia type  Currently untreated, reviewed history of statin intolerance with significant adverse effects.  Reviewed benefits versus risks of initiating PCSK9 inhibitor for primary prevention, as his ASCVD risk score is 32%.  Discussed expected benefits, mechanism of action, potential adverse effects, and how to administer praluent.  We will identify whether this is covered by insurance  - initiate alirocumab (PRALUENT PEN) 75 mg/mL PnIj; Inject 75 mg under the skin every 14 (fourteen) days.  Dispense: 2 mL; Refill: 11    2. Benign essential HTN  Not at goal blood pressure less than 130/80 per ACC/AHA hypertension guidelines.  Recent switch in medications, and noting that he may have whitecoat syndrome, suggest that we will reassess at follow-up.    3. Takes dietary supplements  Stable. Recommended to continue current regimen.     Follow Up  Return in about 1 week (around 3/13/2020) for Rose Kirby, PharmD, BCACP, via [employer].      Subjective & Objective                                                     Issac Stewart is a 69 y.o. male coming in for an initial visit for Medication Therapy Management. He was referred to me from Danile Mercado MD.     Chief Complaint: Medication management    Medication Adherence/Access: stable, no issues identified.     Hyperlipidemia: Recently seen by his primary doctor, and reviewed continued elevated cholesterol, with history of significant myalgias due to statins.  He is an avid bicycler, and after 40 to 50 miles he would find himself incredibly weak and in pain.  He would need to stop to try and resolve this pain.  He has not yet tried Zetia, however reviewed that the potential potency of this medication would not decrease his cholesterol adequately.  He has tried and failed simvastatin. Reviewed additional alternatives such as PCSK9  inhibitors, as previously discussed with PCP at recent appointment.  Lab Results   Component Value Date    CHOL 295 (H) 03/02/2020    CHOL 267 (H) 01/17/2019    CHOL 283 (H) 04/25/2018     Lab Results   Component Value Date    HDL 59 03/02/2020    HDL 61 01/17/2019    HDL 61 04/25/2018     Lab Results   Component Value Date    LDLCALC 220 (H) 03/02/2020    LDLCALC 194 (H) 01/17/2019    LDLCALC 212 (H) 04/25/2018   The 10-year ASCVD risk score (Laurence TINAJERO Jr., et al., 2013) is: 32%    Values used to calculate the score:      Age: 69 years      Sex: Male      Is Non- : No      Diabetic: No      Tobacco smoker: No      Systolic Blood Pressure: 152 mmHg      Is BP treated: Yes      HDL Cholesterol: 59 mg/dL      Total Cholesterol: 295 mg/dL    Hypertension: recent switch from losartan to valsartan due to difficulties in getting losartan in stock. Denies s/sx adverse effects due to new regimen.     Takes dietary supplements: He is recently restarted several supplements including vitamin C, vitamin D, fish oil, probiotic, multivitamin, coenzyme Q 10, and zinc.  He prefers to take these medications for his general health.    PMH: reviewed in EPIC   Allergies/ADRs: reviewed in EPIC   Alcohol: not discussed  Tobacco:   Social History     Tobacco Use   Smoking Status Never Smoker   Smokeless Tobacco Never Used     Today's Vitals:   Vitals:    03/06/20 1114 03/06/20 1120   BP: 160/80 152/80   Pulse: (!) 58    Weight: 217 lb (98.4 kg)      ----------------    The patient was given a summary of these recommendations as an after visit summary    I spent 30 minutes with this patient today. An extra 15 minutes was spent creating the Medication Action Plan. All changes were made via collaborative practice agreement with Daniel Mercado MD. A copy of the visit note was provided to the patient's provider.     Sarwat Kirby, PharmD, BCACP  Medication Management (MTM) Pharmacist  [facility]  [facility]    Current Outpatient Medications   Medication Sig Dispense Refill     ASCORBATE CALCIUM (VITAMIN C ORAL) Take 1,000 mg by mouth daily.       aspirin (ASPIRIN LOW DOSE) 81 MG EC tablet Take 1 tablet by mouth daily.       ERGOCALCIFEROL, VITAMIN D2, (VITAMIN D2 ORAL) Take 2,000 Units by mouth daily.       fish oil-omega-3 fatty acids 300-1,000 mg capsule Take 1 g by mouth daily.       hydroCHLOROthiazide (MICROZIDE) 12.5 mg capsule Take 1 capsule (12.5 mg total) by mouth daily. 90 capsule 3     Lactobacillus rhamnosus GG (CULTURELLE) 15 billion cell CpSP Take 1 capsule by mouth daily.       multivitamin therapeutic tablet Take 1 tablet by mouth daily.       ubidecarenone (COENZYME Q10) 100 mg Tab tablet Take 100 mg by mouth daily.       valsartan (DIOVAN) 160 MG tablet Take 1 tablet (160 mg total) by mouth daily. 30 tablet 11     zinc gluconate 50 mg tablet Take 50 mg by mouth daily.       alirocumab (PRALUENT PEN) 75 mg/mL PnIj Inject 75 mg under the skin every 14 (fourteen) days. 2 mL 11     No current facility-administered medications for this visit.

## 2021-06-06 NOTE — TELEPHONE ENCOUNTER
Prior Authorization Request  Who s requesting:  Pharmacy  Pharmacy Name and Location: [facility]   Medication Name: alirocumab (PRALUENT PEN) 75 mg/mL PnIj   Insurance Plan: [place] [place]   Insurance Member ID Number:  [id number]  CoverMyMeds Key: [id number]  Informed patient that prior authorizations can take up to 10 business days for response:   NA  Okay to leave a detailed message: No

## 2021-06-06 NOTE — TELEPHONE ENCOUNTER
Former patient of Sanjuanita & has not established care with another provider.  Please assign refill request to covering provider per Clinic standard process.      RN cannot approve Refill Request    RN can NOT refill this medication Protocol failed and NO refill given.      Emmie Smith, Care Connection Triage/Med Refill 2/19/2020    Requested Prescriptions   Pending Prescriptions Disp Refills     spironolactone (ALDACTONE) 25 MG tablet [Pharmacy Med Name: SPIRONOLACTONE 25 MG TABLET] 90 tablet 1     Sig: TAKE 1 TABLET BY MOUTH EVERY DAY       Diuretics/Combination Diuretics Refill Protocol  Failed - 2/16/2020 10:07 AM        Failed - Visit with PCP or prescribing provider visit in past 12 months     Last office visit with prescriber/PCP: Visit date not found OR same dept: Visit date not found OR same specialty: Visit date not found  Last physical: Visit date not found Last MTM visit: Visit date not found   Next visit within 3 mo: Visit date not found  Next physical within 3 mo: Visit date not found  Prescriber OR PCP: Alex Hartman MD  Last diagnosis associated with med order: 1. HTN (hypertension)  - spironolactone (ALDACTONE) 25 MG tablet [Pharmacy Med Name: SPIRONOLACTONE 25 MG TABLET]; TAKE 1 TABLET BY MOUTH EVERY DAY  Dispense: 90 tablet; Refill: 1    If protocol passes may refill for 12 months if within 3 months of last provider visit (or a total of 15 months).             Failed - Serum Potassium in past 12 months      No results found for: LN-POTASSIUM          Failed - Serum Sodium in past 12 months      No results found for: LN-SODIUM          Failed - Blood pressure on file in past 12 months     BP Readings from Last 1 Encounters:   01/17/19 140/86             Failed - Serum Creatinine in past 12 months      Creatinine   Date Value Ref Range Status   01/17/2019 1.20 0.70 - 1.30 mg/dL Final

## 2021-06-06 NOTE — PROGRESS NOTES
ASSESSMENT AND PLAN:    1. HTN (hypertension)  He can't get losartan now, will use valsartan, and continue hydrochlorthiazide.  Will stop spironolactone.    - valsartan (DIOVAN) 160 MG tablet; Take 1 tablet (160 mg total) by mouth daily.  Dispense: 30 tablet; Refill: 11    2. Adenomatous polyp of colon, unspecified part of colon  2017 colonoscopy, repeat due in 2022.     3. Hyperlipidemia LDL goal <100  Could not tolerate statins.  He is primary prevention - no history of ischemic event.  Could consider PCSK9 agent, given the high LDL - >180.  Will as pharmacy to inquire   - Ambulatory referral to Medication Management  - Lipid [place] FASTING  - Comprehensive Metabolic Panel  - HM1(CBC and Differential)  - HM1 (CBC with Diff)    4. Screening for prostate cancer  We discussed the information regarding screening.  Given his significant health and high likelihood of living 20 years, he is interested in continued screening.   - PSA, Annual Screen (Prostatic-Specific Antigen)    We discussed low risk levels of exercise and avoiding sudden, extreme exertion.   Follow up in 6 months.      CHIEF COMPLAINT:  Chief Complaint   Patient presents with     [facility]     medications      HISTORY OF PRESENT ILLNESS:  Issac Stewart is a 69 y.o. male here to [facility].  He feels well.  Continues to exercise with bicycling indoors during the winter.  Tolerates this well.  No unusual dyspnea or chest pain.  No bowel or bladder function changes or problems.      REVIEW OF SYSTEMS:   See HPI, all other systems on review are negative.    Past Medical History:   Diagnosis Date     Abnormal EKG 2007    stress equivocal; cardiac MRI ok; inverted T waves     Adenomatous colon polyp      CKD (chronic kidney disease), stage II 2015    creat 1.3-1.5 range     Hyperlipidemia     intolerant statins     Hypertension      Renal artery stenosis due to fibromuscular dysplasia (H) 2010    minimal     Social History     Tobacco Use    Smoking Status Never Smoker   Smokeless Tobacco Never Used     Family History   Problem Relation Age of Onset     Heart failure Father      Stroke Father      Cancer Paternal Grandmother         head and neck     Past Surgical History:   Procedure Laterality Date     COLONOSCOPY  2007    normal q 10 yr plan     FOOT SURGERY Left 2006    Gan's neuroma     LIPECTOMY  2007    multiple lipomas excised     LIPOMA RESECTION  11/15/2016    DR. RUSH     VITALS:  Vitals:    03/02/20 1019   BP: 132/88   Pulse: 61   Resp: 16   SpO2: 97%   Weight: 217 lb (98.4 kg)     Wt Readings from Last 3 Encounters:   03/02/20 217 lb (98.4 kg)   01/17/19 204 lb (92.5 kg)   04/25/18 209 lb (94.8 kg)     PHYSICAL EXAM:  Constitutional:  In NAD, alert and oriented  Neck: no cervical or axillary adenopathy  Cardiac:  S1 S2   Lungs: Clear to auscultation  Abdomen:   Soft, flat and non-tender, without guarding, rebound, or mass.      DECISION TO OBTAIN OLD RECORDS AND/OR OBTAIN HISTORY FROM SOMEONE OTHER THAN PATIENT, AND/OR ACCESSING CARE EVERYWHERE):  1  0     REVIEW AND SUMMARIZATION OF OLD RECORDS, AND/OR OBTAINING HISTORY FROM SOMEONE OTHER THAN PATIENT, AND/OR DISCUSSION OF CASE WITH ANOTHER HEALTH CARE PROVIDER:  2 reviewed previous health evaluation    REVIEW AND/OR ORDER OF OF CLINICAL LAB TESTS: 1  Reviewed past PSA and lipid results.    REVIEW AND/OR ORDER OF RADIOLOGY TESTS: 1 0.    REVIEW AND/OR ORDER OF MEDICAL TESTS (EKG/ECHO/COLONOSCOPY/EGD): 1  Reviewed most recent colonoscopy    INDEPENDENT  VISUALIZATION OF IMAGE, TRACING, OR SPECIMEN ITSELF (2 EACH):  0     TOTAL: 4    Current Outpatient Medications   Medication Sig Dispense Refill     hydroCHLOROthiazide (MICROZIDE) 12.5 mg capsule Take 1 capsule (12.5 mg total) by mouth daily. 90 capsule 3     losartan (COZAAR) 50 MG tablet Take 1 tablet (50 mg total) by mouth daily. 90 tablet 3     MULTIVITS/IRON FUM/FA/D3/LYCOP (MULTI FOR HIM ORAL) Take by mouth.        spironolactone (ALDACTONE) 25 MG tablet TAKE 1 TABLET BY MOUTH EVERY DAY 90 tablet 1     ASCORBATE CALCIUM (VITAMIN C ORAL) Take by mouth.       aspirin (ASPIRIN LOW DOSE) 81 MG EC tablet        ERGOCALCIFEROL, VITAMIN D2, (VITAMIN D2 ORAL) Take by mouth.       GLUCOSAMINE/CHONDROITIN SULF A (GLUCOSAMINE-CHONDROITIN ORAL) Take by mouth.       losartan-hydrochlorothiazide (HYZAAR) 50-12.5 mg per tablet TAKE 1 TABLET BY MOUTH TWICE DAILY 180 tablet 1     valsartan (DIOVAN) 160 MG tablet Take 1 tablet (160 mg total) by mouth daily. 30 tablet 11     Daniel Mercado MD  Internal Medicine  [facility]

## 2021-06-07 NOTE — TELEPHONE ENCOUNTER
Central PA team  [phone]  Pool: HE PA MED ([id number])          PA has been initiated.       PA form completed and faxed insurance via Cover My Meds     Key:  [id number]     Medication:  Repatha SureClick 140MG/ML auto-injectors    Insurance:  [place] Part [place]        Response will be received via fax and may take up to 5-10 business days depending on plan

## 2021-06-07 NOTE — TELEPHONE ENCOUNTER
Prior Authorization Request  Who s requesting:  Pharmacy  Pharmacy Name and Location: [facility]  Medication Name: Repatha  Insurance Plan: Blue Cross Medicare advantage  Insurance Member ID Number:  [id number]  CoverMyMeds Key: N/A  Informed patient that prior authorizations can take up to 10 business days for response:   Yes  Okay to leave a detailed message: Yes

## 2021-06-08 NOTE — TELEPHONE ENCOUNTER
Who is calling:  Patient  Reason for Call:  States would like his health maintenance updated. States he has had a flu shot and also his shingle shots.  States he had a physical on 3/2/2020.  Date of last appointment with primary care: 3/2/2020  Okay to leave a detailed message: Yes    I spoke to him about his symptoms.  Mainly in the AM, better after initial activity such that he can bike, etc.  Ache is in the buttock and down the posterior thigh.  No swelling.  No foot or leg weakness.  Been going on for a few weeks.  We discussed using ibuprofen, stretching, and follow.  If fails to improve or worsens, will need further evaluation, possibly with MRI.  Dr. Rogelio MD

## 2021-06-08 NOTE — TELEPHONE ENCOUNTER
Referral Request  Type of referral: Neurologist  Who s requesting: Patient  Why the request:  has right side sciatic nerve pain for one month now. States every morning when he gets up he has pain from glute down to hamstring.   States has to walk it out for a 1/2 hour to make it better.  Have you been seen for this request: no  Does patient have a preference on a group/provider? Insurance based  Okay to leave a detailed message?  Yes

## 2021-06-09 NOTE — TELEPHONE ENCOUNTER
Patient Returning Call  Reason for call:  Return call   Information relayed to patient:  Caller was informed of message below.   Patient has additional questions:  Yes  If YES, what are your questions/concerns:  Caller is okay with the medication, please get it sent over.   Okay to leave a detailed message?: No

## 2021-06-09 NOTE — PROGRESS NOTES
Assessment and Plan:       1. Healthcare maintenance      2. Hyperlipemia  Off statins unable to tolerate;    - Comprehensive Metabolic Panel  - HM2(CBC w/o Differential)  - Lipid [place]  - Thyroid Stimulating Hormone (TSH)    3. Prostate cancer screening    - PSA (Prostatic-Specific Antigen), Annual Screen    4. Benign essential HTN  See current meds BP reasonable      The patient's current medical problems were reviewed.    I have had an Advance Directives discussion with the patient.  The following health maintenance schedule was reviewed with the patient and provided in printed form in the after visit summary:   Health Maintenance   Topic Date Due     FALL RISK ASSESSMENT  03/29/2018     ADVANCE DIRECTIVES DISCUSSED WITH PATIENT  02/03/2021     COLONOSCOPY  02/22/2022     TD 18+ HE  09/20/2022     PNEUMOCOCCAL POLYSACCHARIDE VACCINE AGE 65 AND OVER  Completed     INFLUENZA VACCINE RULE BASED  Completed     PNEUMOCOCCAL CONJUGATE VACCINE FOR ADULTS (PCV13 OR PREVNAR)  Completed     ZOSTER VACCINE  Completed        Subjective:   Chief Complaint: Issac Stewart is an 66 y.o. male here for an Annual Wellness visit.   HPI:  He continues to do remarkably well. Works out aerobic and [employer] 5x week/ completely retired. No concerns tolerates current regimen. Unable to tolerate statins despite multiple attempts. No angina. No claudication. No issue with meds.     Review of Systems  Please see above.  The rest of the review of systems are negative for all systems.    Patient Care Team:  Daniel Gann MD as PCP - General     Patient Active Problem List   Diagnosis     Fibromuscular Arterial Hyperplasia     Renal Insufficiency     Abnormal Electrocardiogram     Muscle Aches, Generalized (Myalgias)     Hyperlipemia     Benign essential HTN     Past Medical History:   Diagnosis Date     Abnormal EKG 2007    stress equivocal; cardiac MRI ok; inverted T waves     CKD (chronic kidney disease), stage II 2015    creat  1.3-1.5 range     Hyperlipidemia     intolerant statins     Hypertension      Renal artery stenosis due to fibromuscular dysplasia 2010    minimal      Past Surgical History:   Procedure Laterality Date     COLONOSCOPY  2007    normal q 10 yr plan     FOOT SURGERY Left 2006    Gan's neuroma     LIPECTOMY  2007    multiple lipomas excised     LIPOMA RESECTION  11/15/2016    DR. RUSH      Family History   Problem Relation Age of Onset     Heart failure Father      Stroke Father       Social History     Social History     Marital status:      Spouse name: N/A     Number of children: N/A     Years of education: N/A     Occupational History      [employer]     retired      Social History Main Topics     Smoking status: Never Smoker     Smokeless tobacco: Not on file     Alcohol use Not on file     Drug use: Not on file     Sexual activity: Not on file     Other Topics Concern     Not on file     Social History Narrative    Retired () [employer].  2 grown kids; dad (Dylan) long term [employer] patient-. Non smoker/rare ETOH. Very avid exercise enthusiast. Bikes/golf/. Works part time in half-way at [employer] on [address]      Current Outpatient Prescriptions   Medication Sig Dispense Refill     ASCORBATE CALCIUM (VITAMIN C ORAL) Take by mouth.       aspirin (ASPIRIN LOW DOSE) 81 MG EC tablet        ERGOCALCIFEROL, VITAMIN D2, (VITAMIN D2 ORAL) Take by mouth.       GLUCOSAMINE/CHONDROITIN SULF A (GLUCOSAMINE-CHONDROITIN ORAL) Take by mouth.       losartan-hydrochlorothiazide (HYZAAR) 50-12.5 mg per tablet TAKE 1 TABLET BY MOUTH TWICE DAILY 180 tablet 4     MULTIVITS/IRON FUM/FA/D3/LYCOP (MULTI FOR HIM ORAL) Take by mouth.       spironolactone (ALDACTONE) 25 MG tablet Take 1 tablet (25 mg total) by mouth daily. 90 tablet 3     No current facility-administered medications for this visit.       Objective:   Vital Signs:   Visit Vitals     /80  "(Patient Site: Left Arm, Patient Position: Sitting, Cuff Size: Adult Regular)     Pulse (!) 52     Temp 98.6  F (37  C) (Oral)     Resp 15     Ht 6' 0.25\" (1.835 m)     Wt 214 lb 9.6 oz (97.3 kg)     SpO2 96%     BMI 28.9 kg/m2        VisionScreening:  No exam data present     PHYSICAL EXAM  Healthy muscle bound man; deep tan  HEENT normal  Neck- no bruits  Chest- clear; scars from lipoma removal right side  Cor: normal, no murmurs, no ectopics  Abd- soft scaphoid  Ext good distal pulses no edema, good muscle tone  Neuro- negative    Assessment Results 3/29/2017   Activities of Daily Living No help needed   Instrumental Activities of Daily Living No help needed   Get Up and Go Score Less than 12 seconds   Mini Cog Total Score 5     A Mini-Cog score of 0-2 suggests the possibility of dementia, score of 3-5 suggests no dementia    Identified Health Risks:     Patient's advanced directive was discussed and I am comfortable with the patient's wishes.    Recent Results (from the past 240 hour(s))   Comprehensive Metabolic Panel   Result Value Ref Range    Sodium 140 136 - 145 mmol/L    Potassium 4.2 3.5 - 5.0 mmol/L    Chloride 103 98 - 107 mmol/L    CO2 27 22 - 31 mmol/L    Anion Gap, Calculation 10 5 - 18 mmol/L    Glucose 93 70 - 125 mg/dL    BUN 34 (H) 8 - 22 mg/dL    Creatinine 1.23 0.70 - 1.30 mg/dL    GFR MDRD Af Amer >60 >60 mL/min/1.73m2    GFR MDRD Non Af Amer 59 (L) >60 mL/min/1.73m2    Bilirubin, Total 0.8 0.0 - 1.0 mg/dL    Calcium 9.7 8.5 - 10.5 mg/dL    Protein, Total 7.3 6.0 - 8.0 g/dL    Albumin 3.9 3.5 - 5.0 g/dL    Alkaline Phosphatase 77 45 - 120 U/L    AST 40 0 - 40 U/L    ALT 40 0 - 45 U/L   HM2(CBC w/o Differential)   Result Value Ref Range    WBC 5.8 4.0 - 11.0 thou/uL    RBC 4.80 4.40 - 6.20 mill/uL    Hemoglobin 14.9 14.0 - 18.0 g/dL    Hematocrit 44.2 40.0 - 54.0 %    MCV 92 80 - 100 fL    MCH 31.0 27.0 - 34.0 pg    MCHC 33.7 32.0 - 36.0 g/dL    RDW 12.5 11.0 - 14.5 %    Platelets 207 140 - 440 " thou/uL    MPV 9.3 7.0 - 10.0 fL   Lipid [place]   Result Value Ref Range    Cholesterol 255 (H) <=199 mg/dL    Triglycerides 57 <=149 mg/dL    HDL Cholesterol 58 >=40 mg/dL    LDL Calculated 186 (H) <=129 mg/dL    Patient Fasting > 8hrs? Yes    Thyroid Stimulating Hormone (TSH)   Result Value Ref Range    TSH 2.24 0.30 - 5.00 uIU/mL   PSA (Prostatic-Specific Antigen), Annual Screen   Result Value Ref Range    PSA 2.6 0.0 - 4.5 ng/mL

## 2021-06-11 NOTE — PROGRESS NOTES
ASSESSMENT AND PLAN:    1. Lumbar radiculopathy  Mostly AM symptoms, not below the leg, but persistent.  Not clinically related to hip joint.  Will get MRI given the chronicity of the symptoms.    - MR Lumbar Spine Without Contrast; Future    2. Hypertension  Not adequately controlled. Will add amlodipine 10 mg po daily.  Will test renal function given history of renal artery stenosis due to fibromuscular dysplasia.      3. Influenza immunization  Given today.     Patient Instructions   1. Add amlodipine 10mg daily to medication regimen.     2. Lab testing today.     3. Get good bp cuff for home monitoring.      4. Follow up 3 months.     CHIEF COMPLAINT:  Chief Complaint   Patient presents with     Follow-up     fasting med check, flu shot     HISTORY OF PRESENT ILLNESS:  Issac Stewart is a 69 y.o. male here in follow up. Still getting pain in the right lateral leg down to mid upper lateral leg, every morning.  Not at night or in hip, no pain with lying on the right hip side.  No foot weakness or gait impairment.  No weight bearing pain in the right hip.  Otherwise feels well.  No dyspnea or cough or fever. Taking his Bp medication.  Denies headaches or any chest pain.  Is an avid cyclist.     REVIEW OF SYSTEMS:   See HPI, all other systems on review are negative.    Past Medical History:   Diagnosis Date     Abnormal EKG 2007    stress equivocal; cardiac MRI ok; inverted T waves     Adenomatous colon polyp      CKD (chronic kidney disease), stage II 2015    creat 1.3-1.5 range     Hyperlipidemia     intolerant statins     Hypertension      Renal artery stenosis due to fibromuscular dysplasia (H) 2010    minimal     Social History     Tobacco Use   Smoking Status Never Smoker   Smokeless Tobacco Never Used     Family History   Problem Relation Age of Onset     Heart failure Father      Stroke Father      Cancer Paternal Grandmother         head and neck     Past Surgical History:   Procedure Laterality Date      COLONOSCOPY  2007    normal q 10 yr plan     FOOT SURGERY Left 2006    Gan's neuroma     LIPECTOMY  2007    multiple lipomas excised     LIPOMA RESECTION  11/15/2016    DR. RUSH     VITALS:  Vitals:    09/14/20 0943 09/14/20 0949   BP: (!) 160/98 (!) 158/92   Patient Site: Right Arm    Patient Position: Sitting    Cuff Size: Adult Regular    Pulse: (!) 50    SpO2: 98%    Weight: 215 lb (97.5 kg)    Height: 6' (1.829 m)      Wt Readings from Last 3 Encounters:   09/14/20 215 lb (97.5 kg)   03/06/20 217 lb (98.4 kg)   03/02/20 217 lb (98.4 kg)     PHYSICAL EXAM:  Constitutional:  In NAD, alert and oriented  Cardiac:  S1 S2   Lungs: Clear   Abdomen:   Soft, flat and non-tender  Extremities: very good ROM of both hips, can walk on heels and toes without weakness  Neurologic:  SLR bilaterally are normal.     Current Outpatient Medications   Medication Sig Dispense Refill     alirocumab (PRALUENT PEN) 75 mg/mL PnIj Inject 75 mg under the skin every 14 (fourteen) days. 2 mL 11     ASCORBATE CALCIUM (VITAMIN C ORAL) Take 1,000 mg by mouth daily.       aspirin (ASPIRIN LOW DOSE) 81 MG EC tablet Take 1 tablet by mouth daily.       ERGOCALCIFEROL, VITAMIN D2, (VITAMIN D2 ORAL) Take 2,000 Units by mouth daily.       evolocumab (REPATHA SURECLICK) 140 mg/mL PnIj Inject 140 mg under the skin every 14 (fourteen) days. 6 mL 3     fish oil-omega-3 fatty acids 300-1,000 mg capsule Take 1 g by mouth daily.       hydroCHLOROthiazide (MICROZIDE) 12.5 mg capsule TAKE 1 CAPSULE BY MOUTH EVERY DAY 90 capsule 3     Lactobacillus rhamnosus GG (CULTURELLE) 15 billion cell CpSP Take 1 capsule by mouth daily.       losartan (COZAAR) 50 MG tablet Take 1 tablet (50 mg total) by mouth daily. 90 tablet 3     multivitamin therapeutic tablet Take 1 tablet by mouth daily.       ubidecarenone (COENZYME Q10) 100 mg Tab tablet Take 100 mg by mouth daily.       valsartan (DIOVAN) 160 MG tablet Take 1 tablet (160 mg total) by mouth daily. 30 tablet  11     zinc gluconate 50 mg tablet Take 50 mg by mouth daily.       Daniel Mercado MD  Internal Medicine  [facility]

## 2021-06-11 NOTE — PATIENT INSTRUCTIONS - HE
1. Add amlodipine 10mg daily to medication regimen.     2. Lab testing today.     3. Get good bp cuff for home monitoring.      4. Follow up 3 months.

## 2021-06-11 NOTE — TELEPHONE ENCOUNTER
I left a message on his identified voice mail about the MRI lumbar spine with spinal stenosis.  I am referring him to the spine clinic.  MD Austin

## 2021-06-12 NOTE — PROGRESS NOTES
Assessment:   Issac Stewart is a 69 y.o. y.o. male with past medical history significant for renal insufficiency, hyperlipidemia, hypertension who presents today for follow-up regarding a 9-month history of pain radiating down the right lower extremity in an S1 pattern.  MRI lumbar spine shows spondylolisthesis L5 on S1.  There is severe spinal stenosis with right greater than left lateral recess stenosis at L5-S1 related to a disc osteophyte complex and superimposed right paracentral disc protrusion.  Patient status post a right L5-S1, S1-S2 transforaminal epidural steroid injection October 19, 2020 which has provided 40% relief of his pain (injection just started taking effect 4 days ago).  He demonstrates slight weakness in the right ankle plantar flexors only noticeable when single-leg toe raising x5.       Plan:     A shared decision making plan was used.  The patient's values and choices were respected.  The following represents what was discussed and decided upon by the physician assistant and the patient.      1.  DIAGNOSTIC TESTS: I reviewed the MRI lumbar spine.  No further diagnostic tests were ordered.    2.  PHYSICAL THERAPY: Patient went to 1 session of physical therapy October 14.  He is doing his home exercises.  Encouraged him to continue with a home exercise program.  No further physical therapy was ordered.  If pain were to flare back up, would recommend return to physical therapy.    3.  MEDICATIONS:    -Patient is currently taking gabapentin 300 mg at bedtime.  He did not tolerate taking this medication during the day due to drowsiness.  Told the patient if the medication does not seem to have any effect on his pain he can discontinue it.  -Patient also takes Aleve in the morning.    4.  INTERVENTIONS: No additional interventions were ordered.  If pain were to flare back up, would recommend repeating the right L5-S1, S1-S2 transforaminal epidural steroid injection.    5.  PATIENT EDUCATION: I  did offer a referral to surgery for the patient.  Patient declined.  He states that he would like to see how he does with this injection first. Hopefully the injection will provide more significant relief over the coming days.  It did take about 11 days to take effect and he may not have reached the peak effect yet.  I did explain to the patient that he demonstrated some weakness in the right ankle plantar flexors and the longer that persists the higher the chance that there could be some permanent loss of strength.  He voiced understanding to this.    6.  FOLLOW-UP: Patient will follow up with me as needed.  He will contact our clinic if he wants to pursue a surgical consultation.  He is also aware that he could have a repeat injection if pain were to worsen.  He can have up to 4 injections/year.     Subjective:     Issac Stewart is a 69 y.o. male who presents today for follow-up regarding pain radiating down the right lower extremity.  Patient is status post a right L5-S1, S1-S2 transforaminal epidural steroid injection October 19, 2020.  Patient reports the injection took about 11 days to take effect.  Over the past 4 days, he has noticed improvement in his pain.  He estimates his improvement now at 40%.    Patient complains of pain which begins in the right buttock.  Pain radiates into the posterior thigh.  Pain does not reach to the knee.  He gets occasional pain on the right lateral foot.  He denies any numbness or tingling.  He states that the right leg is weaker than left.  He notices this only when he is bicycling.  He does not notice the weakness at any other time during his day.  He did go for a ride on Saturday, after he started having some improvement in his pain.  He did not see any improvement in his strength that day.  He rates his pain today as a 3 out of 10.  At its worst it is an 8-10.  At its best it is a 0 out of 10.  Pain is worse when he first wakes up in the morning.  Pain gets better as  he is up and about.      Patient with 1 session of physical therapy October 14.  He is doing his home exercises.  He takes Aleve in the morning which helps somewhat.  He is taking gabapentin 300 mg at bedtime.  He notices that this helps with sleep but he is not sure if it is helping his pain.  He tried taking 300 mg twice daily and he did not tolerated due to drowsiness.    Past medical history is reviewed and is unchanged.    Review of Systems:  Positive for weakness.  Negative for numbness/tingling, loss of bowel/bladder control, inability to urinate, headache, pain much worse at night, trip/double/falls, difficulty swallowing, difficulty with hand skills, fevers, unintentional weight loss.     Objective:   CONSTITUTIONAL:  Vital signs as above.  No acute distress.  The patient is well nourished and well groomed.    PSYCHIATRIC:  The patient is awake, alert, oriented to person, place and time.  The patient is answering questions appropriately with clear speech.  Normal affect.  HEENT: Normocephalic, atraumatic.  Sclera clear.    SKIN:  Skin over the face, posterior torso, bilateral upper and lower extremities is clean, dry, intact without rashes.  VASCULAR: No significant lower extremity edema.  MUSCULOSKELETAL:  Gait is non-antalgic.  The patient is able to heel and toe walk without any difficulty.  Patient demonstrates some fatigability with single-leg toe raising on the right x5.  Able to single-leg toe raise on the left x5 without difficulty.  No tenderness over the bilateral lower lumbar paraspinal muscles.      The patient has 5/5 strength for the bilateral hip flexors, knee flexors/extensors, ankle dorsiflexors/plantar flexors, ankle evertors/invertors.    NEUROLOGICAL: Trace patellar, achilles reflexes which are symmetric bilaterally.  No ankle clonus bilaterally.  Sensation to light touch is intact in the bilateral L4, L5, and S1 dermatomes.       RESULTS:  I reviewed the MRI lumbar spine from [facility]  dated September 30, 2020.  This shows multilevel lumbar spondylosis with low-grade subluxations at multiple levels.  At L5-S1 there is a circumferential disc osteophyte complex and superimposed right central disc protrusion and mild bilateral facet arthropathy combining to cause severe spinal stenosis with right greater than left lateral recess stenosis.  There is also mild to moderate right and severe left foraminal stenosis at this level.  At L4-5 there is mild to moderate right and severe left foraminal stenosis.  At L3-4 there is mild to moderate bilateral foraminal stenosis.  Please see report for further details.

## 2021-06-12 NOTE — PROGRESS NOTES
ASSESSMENT: Issac Stewart is a 69 y.o. male with past medical history significant for  renal insufficiency, hyperlipidemia, hypertension  who presents today for new patient evaluation of an 8-month history of pain radiating into the right lower extremity.  Pain follows an S1 pattern.  MRI lumbar spine shows spondylolisthesis L5 on S1.  There is severe spinal stenosis with right greater than left lateral recess stenosis at L5-S1 related to a disc osteophyte complex and superimposed right paracentral disc protrusion.  Pain is radicular in nature, rather than claudicating.  On exam, patient demonstrated weakness in the right ankle plantar flexors noticeable with fatiguing on the right with single leg toe raises.    RENUKA:6  Who 5:20    PLAN:  A shared decision making model was used.  The patient's values and choices were respected.  The following represents what was discussed and decided upon by the physician assistant and the patient.      1.  DIAGNOSTIC TESTS: I reviewed the MRI lumbar spine in detail with the patient with help of a spine model.  No further diagnostic tests were ordered.    2.  PHYSICAL THERAPY:  Discussed the importance of core strengthening, ROM, stretching exercises with the patient and how each of these entities is important in decreasing pain.  Explained to the patient that the purpose of physical therapy is to teach the patient a home exercise program.  These exercises need to be performed every day in order to decrease pain and prevent future occurrences of pain.  I entered a referral to physical therapy.    3.  MEDICATIONS:    -Gabapentin 300 mg was prescribed.  He was given the dosage titration chart.  He may increase his dose up to 300 g 3 times daily.  We may titrate his dose higher, depending on his response.  -Patient can continues on Tylenol as needed.    4.  INTERVENTIONS: Given the patient's weakness on exam, I did offer a right L5-S1, S1-S2 transforaminal epidural steroid injection.  " Due to the patient's weakness, I do not think he needs to trial physical therapy first.  He has had his symptoms for 8 months. The injection will use cortisone.  Cortisone weakens/suppresses the immune system so it does not function as well as it normally does.  We do not know if this could make it more likely that you could get the COVID-19 virus or if you do have the virus, if you are going to be sicker than you would have been if you hadn't had the cortisone injection.  Patient was willing to accept this risk and the order for the injection was placed.    5.  PATIENT EDUCATION:   -I told the patient that some people with this problem do go on to need spine surgery.  However, we will trial conservative treatment first.  -Patient was in agreement the above plan.  All questions were answered.    6.  FOLLOW-UP:   I will see the patient back in the clinic for a 2-week post procedure follow-up after his right L5-S1, S1-S2 transforaminal epidural steroid injection.  If he has questions or concerns in the meantime, he should not hesitate to call.      SUBJECTIVE:  Issac Stewart  Is a 69 y.o. male who presents today in consultation request of Dr. Mercado for new patient evaluation of pain radiating down the right leg.  Patient reports this pain began 8 months ago.  He denies any injury or event to cause the pain.  It is persisting.  Patient notes that he has had episodes of \"sciatica\" on the right leg in the past, but they have always resolved within 1 week.  He has never had pain last this long.    Patient complains of pain in the right buttock.  Pain radiates down the right posterior thigh.  He also has intermittent pain in the right lateral foot.  He states that his back feels tight.  Pain is worse when he first wakes up in the morning.  He states that the first hour is very painful.  It does get better with moving about and taking Tylenol, however, he states that the pain is always there.  He reports that he has " good days and bad days but he has not had a day free of pain for 8 months.  Patient denies any numbness or tingling down the leg.  He does have a sensation of weakness in the right leg.  He notices this when he transitions from seated to standing, that the right leg does not feel steady.  He also notices the weakness when biking.  He is an avid cyclist.  He has been biking with the same group for years.  He used to have no problem keeping up, but now he cannot keep up with the group, especially on pills.  He states that he feels like his leg just does not have the power.  He denies any alleviating factors for his pain.  He has tried doing some piriformis stretches but they were not helpful.      Patient has not had any formal physical therapy for this problem.  He does get a massage once per week and it has not been helpful.  He has tried acupuncture and chiropractic treatment in the past, but not since his pain began.  They were not helpful for him in the past.  He has never had any spine surgeries or spine injections.  He uses Tylenol as needed for pain.    Current Outpatient Medications on File Prior to Visit   Medication Sig Dispense Refill     alirocumab (PRALUENT PEN) 75 mg/mL PnIj Inject 75 mg under the skin every 14 (fourteen) days. 2 mL 11     amLODIPine (NORVASC) 10 MG tablet Take 1 tablet (10 mg total) by mouth daily. 90 tablet 3     ASCORBATE CALCIUM (VITAMIN C ORAL) Take 1,000 mg by mouth daily.       aspirin (ASPIRIN LOW DOSE) 81 MG EC tablet Take 1 tablet by mouth daily.       ERGOCALCIFEROL, VITAMIN D2, (VITAMIN D2 ORAL) Take 2,000 Units by mouth daily.       evolocumab (REPATHA SURECLICK) 140 mg/mL PnIj Inject 140 mg under the skin every 14 (fourteen) days. 6 mL 3     fish oil-omega-3 fatty acids 300-1,000 mg capsule Take 1 g by mouth daily.       hydroCHLOROthiazide (MICROZIDE) 12.5 mg capsule TAKE 1 CAPSULE BY MOUTH EVERY DAY 90 capsule 3     Lactobacillus rhamnosus GG (CULTURELLE) 15 billion cell  CpSP Take 1 capsule by mouth daily.       losartan (COZAAR) 50 MG tablet Take 1 tablet (50 mg total) by mouth daily. 90 tablet 3     multivitamin therapeutic tablet Take 1 tablet by mouth daily.       ubidecarenone (COENZYME Q10) 100 mg Tab tablet Take 100 mg by mouth daily.       valsartan (DIOVAN) 160 MG tablet Take 1 tablet (160 mg total) by mouth daily. 30 tablet 11     zinc gluconate 50 mg tablet Take 50 mg by mouth daily.         No Known Allergies    Past Medical History:   Diagnosis Date     Abnormal EKG 2007    stress equivocal; cardiac MRI ok; inverted T waves     Adenomatous colon polyp      CKD (chronic kidney disease), stage II 2015    creat 1.3-1.5 range     Hyperlipidemia     intolerant statins     Hypertension      Lumbar spondylosis      Renal artery stenosis due to fibromuscular dysplasia (H) 2010    minimal     Spinal stenosis of lumbar region with neurogenic claudication       Patient Active Problem List   Diagnosis     Fibromuscular Arterial Hyperplasia     Renal Insufficiency     Abnormal Electrocardiogram     Hyperlipemia     Benign essential HTN     Adenomatous colon polyp     Spinal stenosis of lumbar region with neurogenic claudication     Lumbar spondylosis         Past Surgical History:   Procedure Laterality Date     COLONOSCOPY  2007    normal q 10 yr plan     FOOT SURGERY Left 2006    Gan's neuroma     LIPECTOMY  2007    multiple lipomas excised     LIPOMA RESECTION  11/15/2016    DR. RUSH       Family History   Problem Relation Age of Onset     Heart failure Father      Stroke Father      Cancer Paternal Grandmother         head and neck       Social history: Patient is .  He is retired.  He drinks alcohol rarely.  Denies tobacco use.  Denies illicit drug use.      ROS: Positive for muscle fatigue, sciatica.  Specifically negative for bowel/bladder dysfunction, fevers,chills, appetite changes, unexplained weight loss.   Otherwise 13 systems reviewed are negative.  Please  see the patient's intake questionnaire from today for details.      OBJECTIVE:  PHYSICAL EXAMINATION:    CONSTITUTIONAL:  Vital signs as above.  No acute distress.  The patient is well nourished and well groomed.  PSYCHIATRIC:  The patient is awake, alert, oriented to person, place, time and answering questions appropriately with clear speech.    HEENT: Normocephalic, atraumatic.  Sclera clear.  Neck is supple.  SKIN:  Skin over the face, bilateral lower extremities, and posterior torso is clean, dry, intact without rashes.    GAIT:  Gait is non-antalgic.  Patient is able to heel and toe walk bilaterally.  However, patient has significant fatigue of the right ankle plantar flexors when single-leg toe raising x5.  He only reese slightly off the ground with toe raises #4 and 5.  He is able to single-leg toe raise on the left x5 without difficulty.  STANDING EXAMINATION: No significant tenderness palpation bilateral lower lumbar paraspinous muscles.  No significant tenderness palpation sacroiliac joints.  No significant tenderness palpation right sciatic notch or right piriformis muscle.  Lumbar range of motion is moderately restricted with flexion, mildly restricted with extension.  MUSCLE STRENGTH:  The patient has 5/5 strength for the bilateral hip flexors, knee flexors/extensors, ankle dorsiflexors/plantar flexors, great toe extensors, ankle evertors/invertors.  NEUROLOGICAL: Trace patellar, and achilles reflexes bilaterally. no Babinski's bilaterally.  No ankle clonus bilaterally. Sensation to light touch is intact in the bilateral L4, L5, and S1 dermatomes.  VASCULAR:  2/4 dorsalis pedis pulses bilaterally.  Bilateral lower extremities are warm.  There is no pitting edema of the bilateral lower extremities.  ABDOMINAL:  Soft, non-distended, non-tender throughout all quadrants.  No pulsatile mass palpated in the left lower quadrant.  LYMPH NODES:  No palpable or tender inguinal lymph nodes.  MUSCULOSKELETAL:  Straight leg raise negative bilaterally.    RESULTS: I reviewed the MRI lumbar spine from [facility] dated September 30, 2020.  This shows multilevel lumbar spondylosis with low-grade subluxations at multiple levels.  At L5-S1 there is a circumferential disc osteophyte complex and superimposed right central disc protrusion and mild bilateral facet arthropathy combining to cause severe spinal stenosis with right greater than left lateral recess stenosis.  There is also mild to moderate right and severe left foraminal stenosis at this level.  At L4-5 there is mild to moderate right and severe left foraminal stenosis.  At L3-4 there is mild to moderate bilateral foraminal stenosis.  Please see report for further details.

## 2021-06-12 NOTE — PATIENT INSTRUCTIONS - HE
DISCHARGE INSTRUCTIONS    During office hours (8:00 a.m.- 4:00 p.m.) questions or concerns may be answered  by calling [facility]ine Center Navigation Nurses at  [phone].  Messages received after hours will be returned the following business day.      In the case of an emergency, please dial 911 or seek assistance at the nearest Emergency Room/Urgent Care facility.     All Patients:    ? You may experience an increase in your symptoms for the first 2 days (It may take anywhere between 2 days- 2 weeks for the steroid to have maximum effect).    ? You may use ice on the injection site, as frequently as 20 minutes each hour if needed.    ? You may take your pain medicine.    ? You may continue taking your regular medication after your injection. If you have had a Medial Branch Block you may resume pain medication once your pain diary is completed.    ? You may shower. No swimming, tub bath or hot tub for 48 hours.  You may remove your bandaid/bandage as soon as you are home.    ? You may resume light activities, as tolerated.    ? Resume your usual diet as tolerated.    ? It is strongly advised that you do not drive for 1-3 hours post injection.    ? If you have had oral sedation:  Do not drive for 8 hours post injection.      ? If you have had IV sedation:  Do not drive for 24 hours post injection.  Do not operate hazardous machinery or make important personal/business decisions for 24 hours.      POSSIBLE STEROID SIDE EFFECTS (If steroid/cortisone was used for your procedure)    -If you experience these symptoms, it should only last for a short period      Swelling of the legs                Skin redness (flushing)       Mouth (oral) irritation     Blood sugar (glucose) levels              Sweats                      Mood changes    Headache    Sleeplessness         POSSIBLE PROCEDURE SIDE EFFECTS  -Call the Spine Center if you are concerned    Increased Pain             Increased numbness/tingling         Nausea/Vomiting            Bruising/bleeding at site        Redness or swelling                                                Difficulty walking        Weakness             Fever greater than 100.5    *In the event of a severe headache after an epidural steroid injection that is relieved by lying down, please call the [facility] to speak with a clinical staff member*

## 2021-06-12 NOTE — PATIENT INSTRUCTIONS - HE
Gabapentin 300mg Dosing Chart    DATE  MORNING AFTERNOON BEDTIME    Day 1 0 0 1    Day 2 0 0 1    Day 3 0 0 1    Day 4 1 0 1    Day 5 1 0 1    Day 6 1 0 1    Day 7 1 1 1    Day 8 1 1 1    Day 9 1 1 1    Day 10 1 1 2    Day 11 1 1 2    Day 12 1 1 2    Day 13 2 1 2    Day 14 2 1 2    Day 15 2 1 2    Day 16 2 2 2    Day 17 2 2 2    Day 18 2 2 2    Day 19 2 2 3    Day 20 2 2 3    Day 21 2 2 3    Day 22 3 2 3    Day 23 3 2 3    Day 24 3 2 3    Day 25 3 3 3    Day 26 3 3 3    Day 27 3 3 3     Continue medication, taking 3 capsules three times daily    Please call if you have any questions regarding how to take your medication  Clinic Phone # [phone]    An epidural steroid injection has been ordered today.      Please note that this injection uses cortisone.  The cortisone may somewhat weaken the immune system.  It is unknown how much the immune system is weakened.  It is unknown if it is weakened to the point that you may be more likely to get the COVID-19 virus, or if you do get the COVID-19 virus, if you would be sicker than you would have been if you had not had the cortisone injection.  If you do not wish to proceed with the injection, please let the nurse/physician know and do NOT schedule the injection.    Please note that since your immune system is weakened from the cortisone, having a flu vaccine/shot may be less effective if you have this vaccine within 2 weeks from your cortisone injection.  It is advised to wait 2 weeks after your cortisone injection to have the flu shot (or if you have the flu shot first, wait 2 weeks before you have the cortisone injection).    Please schedule this injection at least 1 week  from now to allow time for insurance prior authorization.  On the day of your injection, you cannot be sick or taking antibiotics.  If you become sick and are prescribed, please call the clinic so your injection can be rescheduled for once you have completed your antibiotics.  You will need to bring  a  with you for your injection.   If you have any questions or concerns prior to your injection, please do not hesitate to call the nurse navigation line at [phone] or contact Venus Barrera through [employer].

## 2021-06-13 NOTE — PATIENT INSTRUCTIONS - HE
Follow-up visit with VIELKA Siu in 2 weeks to discuss injection outcome and determine care plan going forward.       DISCHARGE INSTRUCTIONS    During office hours (8:00 a.m.- 4:00 p.m.) questions or concerns may be answered  by calling [facility] Navigation Nurses at  [phone].  Messages received after hours will be returned the following business day.      In the case of an emergency, please dial 911 or seek assistance at the nearest Emergency Room/Urgent Care facility.     All Patients:    ? You may experience an increase in your symptoms for the first 2 days (It may take anywhere between 2 days- 2 weeks for the steroid to have maximum effect).    ? You may use ice on the injection site, as frequently as 20 minutes each hour if needed.    ? You may take your pain medicine.    ? You may continue taking your regular medication after your injection. If you have had a Medial Branch Block you may resume pain medication once your pain diary is completed.    ? You may shower. No swimming, tub bath or hot tub for 48 hours.  You may remove your bandaid/bandage as soon as you are home.    ? You may resume light activities, as tolerated.    ? Resume your usual diet as tolerated.    ? It is strongly advised that you do not drive for 1-3 hours post injection.    ? If you have had oral sedation:  Do not drive for 8 hours post injection.      ? If you have had IV sedation:  Do not drive for 24 hours post injection.  Do not operate hazardous machinery or make important personal/business decisions for 24 hours.      POSSIBLE STEROID SIDE EFFECTS (If steroid/cortisone was used for your procedure)    -If you experience these symptoms, it should only last for a short period      Swelling of the legs                Skin redness (flushing)       Mouth (oral) irritation     Blood sugar (glucose) levels              Sweats                      Mood changes    Headache    Sleeplessness    Weakened immune system for up to 14  days, which could increase the risk of roro the COVID-19 virus and/or experiencing more severe symptoms of the disease, if exposed.    Decreased effectiveness of the flu vaccine if given within 2 weeks of the steroid.         POSSIBLE PROCEDURE SIDE EFFECTS  -Call the Spine Center if you are concerned    Increased Pain             Increased numbness/tingling        Nausea/Vomiting            Bruising/bleeding at site        Redness or swelling                                                Difficulty walking        Weakness             Fever greater than 100.5    *In the event of a severe headache after an epidural steroid injection that is relieved by lying down, please call the [facility] to speak with a clinical staff member*

## 2021-06-13 NOTE — TELEPHONE ENCOUNTER
Order placed in Epic. Injection requirements reviewed with patient. Discussed steroids have immunosuppressant properties which could potentially put patient at a higher risk for a worsened course of any infection including COVID. Stated understanding. Transferred to scheduling to make appointment.

## 2021-06-13 NOTE — PATIENT INSTRUCTIONS - HE
Tizanidine (muscle relaxant medication) has been prescribed today. Please take 1 tab every 8 hrs as needed. This medication may cause drowsiness. Please do not work or drive while taking this medication until you know how it effects you. If it does make you drowsy, you should only take it before bedtime or at times that you do not have to work/drive.

## 2021-06-13 NOTE — PROGRESS NOTES
"Issac Stewart is a 70 y.o. male who is being evaluated via a billable video visit.      The patient has been notified of following:     \"This video visit will be conducted via a call between you and your physician/provider. We have found that certain health care needs can be provided without the need for an in-person physical exam.  This service lets us provide the care you need with a video conversation.  If a prescription is necessary we can send it directly to your pharmacy.  If lab work is needed we can place an order for that and you can then stop by our lab to have the test done at a later time.    Video visits are billed at different rates depending on your insurance coverage. Please reach out to your insurance provider with any questions.    If during the course of the call the physician/provider feels a video visit is not appropriate, you will not be charged for this service.\"    Patient has given verbal consent to a Video visit? Yes  How would you like to obtain your AVS? AVS Preference: [employer].  Will anyone else be joining your video visit? No        Video Start Time: 11:44 AM    Additional provider notes:  Assessment:   Issac Stewart is a 70 y.o. y.o. male with past medical history significant for renal insufficiency, hyperlipidemia, hypertension who presents today for follow-up regarding a 9-month history of pain radiating down the right lower extremity in an S1 pattern.  MRI lumbar spine shows spondylolisthesis L5 on S1.  There is severe spinal stenosis with right greater than left lateral recess stenosis at L5-S1 related to a disc osteophyte complex and superimposed right paracentral disc protrusion.  Patient status post a right L5-S1, S1-S2 transforaminal epidural steroid injection October 19, 2020 which provided 40% relief of his pain.  He then underwent a caudal epidural steroid injection November 19, 2020 (4 days ago) which has not provided any relief of his pain.  In fact, he has had worse " pain since the injection.       Plan:     A shared decision making plan was used.  The patient's values and choices were respected.  The following represents what was discussed and decided upon by the physician assistant and the patient.      1.  DIAGNOSTIC TESTS: I reviewed the MRI lumbar spine.    -I ordered lumbar spine flexion-extension x-rays to evaluate for dynamic instability at the level of the spondylolisthesis.    2.  PHYSICAL THERAPY: Patient went to 1 session of physical therapy October 14.  No further physical therapy was ordered.  He should continue with home exercises.    3.  MEDICATIONS:    -Tizanidine 2 mg 3 times daily as needed was prescribed.  -Patient can continues on Tylenol and ibuprofen as needed.  -Patient stopped gabapentin because he did not like the way it made him feel.    4.  INTERVENTIONS: No additional interventions were ordered.    5.  PATIENT EDUCATION: Patient reports that for about an hour this morning he felt chills, generalized weakness, muscle twitches in his legs, and jaw tightness.  He checked his temperature and it was 97 degrees.  Symptoms have resolved.  I recommended that if he feels I told the patient that these are not typical symptoms following an epidural steroid injection.  The symptoms again he contact his primary care provider.  -I did remind the patient that steroid injections can take up to 2 to 3 weeks to reach the peak effect.  Sometimes pain does get worse before it gets better.  He is only 4 days out from his injection.  There is a chance his symptoms will improve.    6.  REFERRALS: Entered a referral to neurosurgery.  Patient has tried and failed conservative treatment including medical management, physical therapy, and epidural steroid injection x2.    7.  FOLLOW-UP: Patient will follow up with me as needed.  We will await recommendations from neurosurgery.  If he has questions or concerns in the meantime, he should not hesitate to call.    Subjective:      Issac Stewart is a 70 y.o. male who presents today for follow-up regarding pain radiating down the right lower extremity.  Patient is status post a caudal epidural steroid injection November 19, 2020.  Patient reports that this injection has not provided any relief of his pain.,  In fact, his pain has been worse since the injection.  Patient reports that prior to this injection his pain would improve as he moves about in the morning.  His morning pain is lasting longer now.  For about an hour this morning he also felt generalized weakness, chills, tightness in his jaw, and muscle twitches in his legs.  Patient reports that those symptoms have completely resolved.  He checked his temperature he did not have a fever.  His temperature is 97 degrees.    Patient complains of pain which begins in the right buttock.  Pain radiates into the posterior thigh.  He denies any pain distal to the knee, but he does get intermittent numbness and tingling in the right posterior calf and right lateral foot.  Patient reports that this morning he felt generally weak but he denies any focal weakness in the right leg.  He states that prior to the injection he only noticed weakness in the right leg when he was riding his bike.  He rates his pain today as an 8 or 9 out of 10.  At its worst it is a 9 and 10.  At its best it is an 8 out of 10.  Pain is aggravated with walking.  Pain is alleviated with lying down.  He denies any new symptoms since he was last seen.  Denies any loss of bowel or bladder control.    Patient with 1 session of physical therapy October 14.  He is doing his home exercises.  He is taking Tylenol and ibuprofen as needed which are somewhat helpful.  He stopped gabapentin.  He did not like the way it made him feel.    Past medical history is reviewed and is pertinent for increasing his blood pressure medication.  He went to the emergency department 2 days after his injection for elevated blood pressure.    Review  of Systems:  Positive for numbness/tingling.  Negative for weakness, loss of bowel/bladder control, inability to urinate, headache, pain much worse at night, trip/stumble/falls, difficulty swallowing, difficulty with hand skills, fevers, unintentional weight loss.     Objective:   CONSTITUTIONAL:  Vital signs as above.  No acute distress.  The patient is well nourished and well groomed.    PSYCHIATRIC:  The patient is awake, alert, oriented to person, place and time.  The patient is answering questions appropriately with clear speech.  Normal affect.  HEENT: Normocephalic, atraumatic.  Sclera clear.    SKIN: Exposed skin is clean, dry, intact without rashes.  MUSCULOSKELETAL:  Gait is non-antalgic.  Patient is observed pacing during his video visit.  The patient is able to heel and toe walk without any difficulty.  Lumbar range of motion within normal limits.       RESULTS:  I reviewed the MRI lumbar spine from [facility] dated September 30, 2020.  This shows multilevel lumbar spondylosis with low-grade subluxations at multiple levels.  At L5-S1 there is a circumferential disc osteophyte complex and superimposed right central disc protrusion and mild bilateral facet arthropathy combining to cause severe spinal stenosis with right greater than left lateral recess stenosis.  There is also mild to moderate right and severe left foraminal stenosis at this level.  At L4-5 there is mild to moderate right and severe left foraminal stenosis.  At L3-4 there is mild to moderate bilateral foraminal stenosis.  Please see report for further details.          Video-Visit Details    Type of service:  Video Visit    Video End Time (time video stopped): 11:58 AM  Originating Location (pt. Location): Home    Distant Location (provider location):  [facility]     Platform used for Video Visit: Martha Barrera PA-C

## 2021-06-13 NOTE — PROGRESS NOTES
Neurosurgery consultation was requested by: Venus Barrera  Pain: Right buttock  Radicular Pain is present: Posterior right thigh   Motor complaints: Minimal weakness in the right leg   Sensory complaints: None   Gait and balance issues: None   Bowel or bladder issues: None   Duration of SX is: 9 months   The symptoms are worse with: Getting up in the morning   The symptoms are better with: Laying down   Injury: None   Severity is:   Patient has tried the following conservative measures: PT, Injection with no improvement   Rani Whyte,[facility],1:57 PM     Modified Oswestry Low back Pain Disability Questionnaire    1. PAIN INTENSITY  4- Pain medication provides me with little relief from pain  2. PERSONAL CARE  1- I can take care of myself normally, but it increases my pain.   3. LIFTING  0- I can lift heavy weights without extra pain  4. WALKING  N/A  5. SITTING  1- I can only sit in my favorite chair as long as I like  6. STANDING  1- I can stand as long as I want, but it increases my pain.  7. SLEEPING  0- Pain does not prevent me from sleeping well.  8. SOCIAL LIFE  0- My social life is normal and does not increase my pain.  9. TRAVELING  0- I can travel anywhere without increased pain.  10. EMPLOYMENT/HOMEMAKING  0- My normal homemaking/job activities do not cause me pain.    SCORE:7 x2=14  16%

## 2021-06-13 NOTE — TELEPHONE ENCOUNTER
Phone call to patient to inform him PSP wants to see him via video today. Stated understanding and appreciation for call back.     He does report the chills he was experiencing earlier are gone. He checked his temperature as well. Does not have a fever at this time. Encouraged him to have the appointment with PSP and she can further evaluate and guide his care. Stated understanding. Transferred to scheduling to make appointment.

## 2021-06-13 NOTE — TELEPHONE ENCOUNTER
Recommend caudal ALVARO as next step since right L4/5, L5/S1 TFESI only provided partial, short term relief.  If caudal ALVARO does not provide significant lasting relief, would recommend referral to neurosurgery.

## 2021-06-13 NOTE — TELEPHONE ENCOUNTER
"PSP:  Venus VORA  Last clinic visit:  11/19/20 Caudal injection; 11/1/20 follow up  Reason for call: status update after injection  Clinical information:  Patient noting increased pain after his caudal injection. \"When I stand there are times where I have tingling that goes all the way down to the bottom of my foot. This is new for me. I only had the sciatic pain in that glut and hamstring. I generally feel weaker all over. I am also experiencing chills since I sent her the message. I'm laying here and I have no pain, but my body doesn't feel right.\"   Advice given to patient: Explained PSP would be updated with status. He would be called with how to proceed. Explained she may want an appointment with him (he does have video capability)  Provider to address: status update and new symptoms   "

## 2021-06-13 NOTE — TELEPHONE ENCOUNTER
Phone call to patient to review results and provider's recommendations. Results given and explained. Stated understanding and appreciation for call.

## 2021-06-13 NOTE — TELEPHONE ENCOUNTER
"Phone call to patient to check on status in follow up to [employer] message. Patient reports his pain is in the same place as when seen: \"the sciatica pain in the right glut and then done the hamstring.\" Denies pain past knee. States the last couple days pain has been bad. \"I can usually walk it out after about 30-45 minutes in the morning, but yesterday I was flat out.\" At best rates pain 0-1/10 and at worst 8-9/10. Discussed the recommended different approach for another injection. Stated understanding.     Explained PSP would be updated with status. He would be called back with the next steps. Stated understanding and appreciation for call.   "

## 2021-06-13 NOTE — PROGRESS NOTES
Patient would like the telephone invitation sent by: Text to cell phone: carmen     ASSESSMENT/PLAN:    1. Benign essential HTN  Has been accelerated recently without other symptoms.  Will change amlodipine to nifedical XR 30 po daily.  He will let me know how control goes.    - NIFEdipine (PROCARDIA XL) 30 MG 24 hr tablet; Take 1 tablet (30 mg total) by mouth daily.  Dispense: 30 tablet; Refill: 11    2. Renal Insufficiency  History of this.  Last creatinine 1.00    CHIEF COMPLAINT:  Chief Complaint   Patient presents with     Follow-up     blood pressure     HISTORY OF PRESENT ILLNESS:  Issac is a 70 y.o. male contacting the clinic today via video for with elevated blood pressure readings.  Not having dyspnea, or chest pain, or headache.  No nausea or diaphoresis. Has been taking the hydrochlorothiazide, the amlodipine, and losartan.      REVIEW OF SYSTEMS:    All other systems are negative.    PFSH:  Social History     Social History Narrative    Retired () [employer].  2 grown kids; dad (Dylan) long term [employer] patient-. Non smoker/rare ETOH. Very avid exercise enthusiast. Bikes/golf/. 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      TOBACCO USE:  Social History     Tobacco Use   Smoking Status Never Smoker   Smokeless Tobacco Never Used     VITALS:  There were no vitals filed for this visit.  Wt Readings from Last 3 Encounters:   20 215 lb (97.5 kg)   20 215 lb (97.5 kg)   10/05/20 215 lb (97.5 kg)      PHYSICAL EXAM:  (observations via Video)  Alert, and oriented in NAD    ADDITIONAL HISTORY SUMMARIZED (2): 0  CARE EVERYWHERE/ EXTRA INFORMATION (1): 1 reviewed ER visit 2020  RADIOLOGY TESTS (1): 0  LABS (1): reviewed ER labes  CARDIOLOGY/MEDICINE TESTS (1): 0  INDEPENDENT REVIEW (2 each):     Total data points: 2    The visit lasted a total of 14 minutes     CA Intake Time: 6    I have reviewed and updated the patient's Past Medical History,  "Social History, Family History as appropriate.    MEDICATIONS: Reviewed and updated per CA and MD  Current Outpatient Medications   Medication Sig Dispense Refill     amLODIPine (NORVASC) 10 MG tablet Take 1 tablet (10 mg total) by mouth daily. 90 tablet 3     ASCORBATE CALCIUM (VITAMIN C ORAL) Take 1,000 mg by mouth daily.       aspirin (ASPIRIN LOW DOSE) 81 MG EC tablet Take 1 tablet by mouth daily.       ERGOCALCIFEROL, VITAMIN D2, (VITAMIN D2 ORAL) Take 2,000 Units by mouth daily.       evolocumab (REPATHA SURECLICK) 140 mg/mL PnIj Inject 140 mg under the skin every 14 (fourteen) days. 6 mL 3     fish oil-omega-3 fatty acids 300-1,000 mg capsule Take 1 g by mouth daily.       hydroCHLOROthiazide (MICROZIDE) 12.5 mg capsule TAKE 1 CAPSULE BY MOUTH EVERY DAY 90 capsule 3     Lactobacillus rhamnosus GG (CULTURELLE) 15 billion cell CpSP Take 1 capsule by mouth daily.       multivitamin therapeutic tablet Take 1 tablet by mouth daily.       tiZANidine (ZANAFLEX) 2 MG tablet Take 1 tablet (2 mg total) by mouth every 8 (eight) hours as needed. 30 tablet 0     traMADoL (ULTRAM) 50 mg tablet Take 1 tablet (50 mg total) by mouth every 8 (eight) hours as needed for pain. 10 tablet 0     ubidecarenone (COENZYME Q10) 100 mg Tab tablet Take 100 mg by mouth daily.       valsartan (DIOVAN) 160 MG tablet Take 1 tablet (160 mg total) by mouth daily. 30 tablet 11     zinc gluconate 50 mg tablet Take 50 mg by mouth daily.       NIFEdipine (PROCARDIA XL) 30 MG 24 hr tablet Take 1 tablet (30 mg total) by mouth daily. 30 tablet 11     The patient has been notified of following:     \"This video visit will be conducted via a call between you and your physician/provider. We have found that certain health care needs can be provided without the need for an in-person physical exam.  This service lets us provide the care you need with a video conversation.  If a prescription is necessary we can send it directly to your pharmacy.  If lab work " "is needed we can place an order for that and you can then stop by our lab to have the test done at a later time.    Video visits are billed at different rates depending on your insurance coverage. Please reach out to your insurance provider with any questions.    If during the course of the call the physician/provider feels a video visit is not appropriate, you will not be charged for this service.\"    Patient has given verbal consent to a Telephone visit? Yes  How would you like to obtain your AVS? AVS Preference: [employer].    Daniel Mercado MD      "

## 2021-06-13 NOTE — PROGRESS NOTES
[facility] Discharge Summary  Patient Name: Issac Stewart  Date: 12/16/2020  Referral Diagnosis: LBP  Referring provider: Venus Barrera PA-C  Visit Diagnosis:   1. Chronic right-sided low back pain with right-sided sciatica     2. Muscle tightness     3. Generalized muscle weakness         Goals: NOT MET  Pt. will demonstrate/verbalize independence in self-management of condition in : 12 weeks  Pt. will be independent with home exercise program in : 6 weeks    Pt will: have less pain in the mornings for increased quality of life within 8 weeks.      Patient was seen for 1 visit.  The patient discontinued therapy, did not return.  The patient was issued a HEP and instructed in proper usage.  No further therapy is required at this time.    Therapy will be discontinued at this time.  The patient will need a new referral to resume.    Thank you for your referral.  Saadia Morocho, PT, DPT  12/16/2020  12:46 PM

## 2021-06-13 NOTE — PROGRESS NOTES
NEUROSURGERY CONSULTATION NOTE    12/1/2020     Issac Stewart is a 70 y.o. male who is sent to us in consultation by Venus Barrera for evaluation of lumbar radiculopathy.         CONSULTATION ASSESSMENT AND PLAN:    69 yo male who presents with right S1 radiculopathy. MRI lumbar spine shows grade I anterolisthesis at lumbar 5-sacral 1 with broad based disc protrusion with superimposed right central disc protrusion causing severe spinal canal stenosis and S nerve root impingement. Possible right pars fracture. Anterolisthesis does not appear to be dynamic on upright xray or from supine to upright. Discussed with radiology difficult to tell if has pars fracture or not on current imaging. Recommend CT lumbar wo contrast to evaluate for pars fracture. If pars fracture discussed instrumented fusion in addition to decompression versus microdiscectomy along. Tramadol given for pain. Will call with results of CT.     I spent more than 45 minutes in this apt, examining the pt, reviewing the scans, reviewing notes from chart, discussing treatment options with risks and benefits and coordinating care. >50 % clinic time was spent in face to face counseling and coordinating care    Sandrine Pierre     HPI:  69 yo male who presents with right LE pain. Symptoms began around 9 months ago. Posterior gluteal to hamstring.  Worsens with getting up in the morning and has to walk to improve the pain. Laying down the pain will improve to 0-1/10. Minimal weakness of right leg. He is an avid biker and noted that he has less power with his right leg. No low back pain or left leg pan or leg numbness or tingling no bowel or bladder dysfunction.    Right L5-S1, S1-S1 TFESI on 10/19/20 gave approximately 20% relief. He then underwent a caudal epidural steroid injection on 11/19/20 which worsened his symptoms. Underwent 1 session of PT. Tizanidine, tylenol, NSAIDs no significant relief.       Prior Spine Surgery:no    Past Medical  History:   Diagnosis Date     Abnormal EKG 2007    stress equivocal; cardiac MRI ok; inverted T waves     Adenomatous colon polyp      CKD (chronic kidney disease), stage II 2015    creat 1.3-1.5 range     Hyperlipidemia     intolerant statins     Hypertension      Lumbar spondylosis      Renal artery stenosis due to fibromuscular dysplasia (H) 2010    minimal     Spinal stenosis of lumbar region with neurogenic claudication      Past Surgical History:   Procedure Laterality Date     COLONOSCOPY  2007    normal q 10 yr plan     FOOT SURGERY Left 2006    Gan's neuroma     LIPECTOMY  2007    multiple lipomas excised     LIPOMA RESECTION  11/15/2016    DR. RUSH       REVIEW OF SYSTEMS:  ROS reviewed with pt as documented on pt health form of 12/1/2020.        MEDICATIONS:  Current Outpatient Medications   Medication Sig Dispense Refill     amLODIPine (NORVASC) 10 MG tablet Take 1 tablet (10 mg total) by mouth daily. 90 tablet 3     ASCORBATE CALCIUM (VITAMIN C ORAL) Take 1,000 mg by mouth daily.       aspirin (ASPIRIN LOW DOSE) 81 MG EC tablet Take 1 tablet by mouth daily.       ERGOCALCIFEROL, VITAMIN D2, (VITAMIN D2 ORAL) Take 2,000 Units by mouth daily.       evolocumab (REPATHA SURECLICK) 140 mg/mL PnIj Inject 140 mg under the skin every 14 (fourteen) days. 6 mL 3     fish oil-omega-3 fatty acids 300-1,000 mg capsule Take 1 g by mouth daily.       hydroCHLOROthiazide (MICROZIDE) 12.5 mg capsule TAKE 1 CAPSULE BY MOUTH EVERY DAY 90 capsule 3     Lactobacillus rhamnosus GG (CULTURELLE) 15 billion cell CpSP Take 1 capsule by mouth daily.       multivitamin therapeutic tablet Take 1 tablet by mouth daily.       tiZANidine (ZANAFLEX) 2 MG tablet Take 1 tablet (2 mg total) by mouth every 8 (eight) hours as needed. 30 tablet 0     ubidecarenone (COENZYME Q10) 100 mg Tab tablet Take 100 mg by mouth daily.       valsartan (DIOVAN) 160 MG tablet Take 1 tablet (160 mg total) by mouth daily. 30 tablet 11     zinc  gluconate 50 mg tablet Take 50 mg by mouth daily.       No current facility-administered medications for this visit.          ALLERGIES/SENSITIVITIES:     No Known Allergies    PERTINENT SOCIAL HISTORY:   Social History     Socioeconomic History     Marital status:      Spouse name: None     Number of children: None     Years of education: None     Highest education level: None   Occupational History     Occupation:      Employer: [employer]     Comment: retired    Social Needs     Financial resource strain: None     Food insecurity     Worry: None     Inability: None     Transportation needs     Medical: None     Non-medical: None   Tobacco Use     Smoking status: Never Smoker     Smokeless tobacco: Never Used   Substance and Sexual Activity     Alcohol use: None     Drug use: None     Sexual activity: None   Lifestyle     Physical activity     Days per week: None     Minutes per session: None     Stress: None   Relationships     Social connections     Talks on phone: None     Gets together: None     Attends Nondenominational service: None     Active member of club or organization: None     Attends meetings of clubs or organizations: None     Relationship status: None     Intimate partner violence     Fear of current or ex partner: None     Emotionally abused: None     Physically abused: None     Forced sexual activity: None   Other Topics Concern     None   Social History Narrative    Retired () [employer].  2 grown kids; dad (Dylan) long term [facility] patient-. Non smoker/rare ETOH. Very avid exercise enthusiast. Bikes/golf/. 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          FAMILY HISTORY:  Family History   Problem Relation Age of Onset     Heart failure Father      Stroke Father      Cancer Paternal Grandmother         head and neck        PHYSICAL EXAM:   Constitutional: BP (!) 168/92   Pulse 62   Resp 16   Ht 6' (1.829 m)   Wt  215 lb (97.5 kg)   SpO2 97%   BMI 29.16 kg/m       Mental Status: A & O in no acute distress.  Affect is appropriate.  Speech is fluent.  Recent and remote memory are intact.  Attention span and concentration are normal.     Motor:Normal bulk and tone all muscle groups of upper and lower extremities.      Sensory: Sensation intact bilaterally to light touch.     Coordination:  Heel/toe/  gait intact.    intact tandem gait      Reflexes; supinator, biceps, triceps, knee/ ankle jerk intact. no hoffmans/ no  babinski/ clonus.    IMAGING: I personally reviewed all radiographic images      Cc:   Daniel Mercado MD  [address] [place] [address]

## 2021-06-13 NOTE — PATIENT INSTRUCTIONS - HE
Discussed L5-S1 Anterior Lumbar Interbody Fusion.  Dr. Pierre will contact you after talking with Radiology.   Surgery teaching will be done closer to surgery.

## 2021-06-15 PROBLEM — I10 BENIGN ESSENTIAL HTN: Status: ACTIVE | Noted: 2017-04-01

## 2021-06-15 PROBLEM — E78.5 HYPERLIPEMIA: Status: ACTIVE | Noted: 2017-04-01

## 2021-06-15 NOTE — TELEPHONE ENCOUNTER
Central PA team  [phone]  Pool: HE PA MED ([id number])          PA has been initiated.       PA form completed and faxed insurance via Cover My Meds     Key:  [id number]     Medication:  REPATHA     Insurance:  [employer] [place]         Response will be received via fax and may take up to 5-10 business days depending on plan

## 2021-06-15 NOTE — TELEPHONE ENCOUNTER
Reason for Call:  Medication or medication refill:  evolocumab (REPATHA SURECLICK) 140 mg/mL PnIj 6 mL         Do you use a [facility]?  Name of the pharmacy and phone number for the current request:[facility]    Name of the medication requested:   evolocumab (REPATHA SURECLICK) 140 mg/mL PnIj 6 mL         Other request: Ana from pharmacy states she needs a new script sent to them for the Repatha since the prior auth was approved.    Can we leave a detailed message on this number? Yes    Phone number patient can be reached at: Other phone number:  [phone]    Best Time: can speak to anyone at the pharmacy.    Call taken on 3/1/2021 at 9:42 AM by Bianca Talbot

## 2021-06-16 NOTE — TELEPHONE ENCOUNTER
Telephone Encounter by Radha Quezada at 3/23/2020  2:09 PM     Author: Radha Quezada Service: -- Author Type: --    Filed: 3/23/2020  2:13 PM Encounter Date: 3/23/2020 Status: Signed    : Radha Quezada APPROVED:    Approval start date: 01/01/2020  Approval end date:  03/23/2021    Pharmacy has been notified of approval and will contact patient when medication is ready for pickup.

## 2021-06-16 NOTE — TELEPHONE ENCOUNTER
Telephone Encounter by Radha Quezada at 3/12/2020 12:35 PM     Author: Radha Quezada Service: -- Author Type: --    Filed: 3/12/2020 12:36 PM Encounter Date: 3/11/2020 Status: Signed    : Radha Quezada       PRIOR AUTHORIZATION DENIED    Denial Rational: Insurance prefers Repatha

## 2021-06-17 NOTE — TELEPHONE ENCOUNTER
Telephone Encounter by Barb Flores at 2/16/2021 11:55 AM     Author: Barb Flores Service: -- Author Type: --    Filed: 2/16/2021 11:55 AM Encounter Date: 2/15/2021 Status: Signed    : Barb Flores APPROVED:    Approval start date: 1/1/2021  Approval end date:  2/15/2022    Pharmacy has been notified of approval and will contact patient when medication is ready for pickup.

## 2021-06-17 NOTE — PATIENT INSTRUCTIONS - HE
Patient Instructions by Daniel Gann MD at 1/17/2019  9:50 AM     Author: Daniel Gann MD Service: -- Author Type: Physician    Filed: 1/17/2019  4:08 PM Encounter Date: 1/17/2019 Status: Addendum    : Daniel Gann MD (Physician)    Related Notes: Original Note by Daniel Gann MD (Physician) filed at 1/17/2019  4:03 PM         Patient Education   Understanding USDA MyPlate  The USDA (US Department of Agriculture) has guidelines to help you make healthy food choices. These are called MyPlate. MyPlate shows the food groups that make up healthy meals using the image of a place setting. Before you eat, think about the healthiest choices for what to put onto your plate or into your cup or bowl. To learn more about building a healthy plate, visit www.choosemyplate.gov.       The Food Groups    Fruits: Any fruit or 100% fruit juice counts as part of the Fruit Group. Fruits may be fresh, canned, frozen, or dried, and may be whole, cut-up, or pureed. Make half your plate fruits and vegetables.    Vegetables: Any vegetable or 100% vegetable juice counts as a member of the Vegetable Group. Vegetables may be fresh, frozen, canned, or dried. They can be served raw or cooked and may be whole, cut-up, or mashed. Make half your plate fruits and vegetables.     Grains: All foods made from grains are part of the Grains Group. These include wheat, rice, oats, cornmeal, and barley such as bread, pasta, oatmeal, cereal, tortillas, and grits. Grains should be no more than a quarter of your plate. At least half of your grains should be whole grains.    Protein: This group includes meat, poultry, seafood, beans and peas, eggs, processed soy products (like tofu), nuts (including nut butters), and seeds. Make protein choices no more than a quarter of your plate. Meat and poultry choices should be lean or low fat.    Dairy: All fluid milk products and foods made from milk that contain calcium,  like yogurt and cheese are part of the Dairy Group. (Foods that have little calcium, such as cream, butter, and cream cheese, are not part of the group.) Most dairy choices should be low-fat or fat-free.    Oils: These are fats that are liquid at room temperature. They include canola, corn, olive, soybean, and sunflower oil. Foods that are mainly oil include mayonnaise, certain salad dressings, and soft margarines. You should have only 5 to 7 teaspoons of oils a day. You probably already get this much from the food you eat.  Use GoMotoer to Help Build Your Meals  The [employer] can help you plan and track your meals and activity. You can look up individual foods to see or compare their nutritional value. You can get guidelines for what and how much you should eat. You can compare your food choices. And you can assess personal physical activities and see ways you can improve. Go to www.SnipSnap.gov/eTimesheets.comcker/.    1273-4128 The "[employer].". [address] [place] [address]. All rights reserved. This information is not intended as a substitute for professional medical care. Always follow your healthcare professional's instructions.             Advance Directive  Patients advance directive was discussed and I am comfortable with the patients wishes.  Patient Education   Personalized Prevention Plan  You are due for the preventive services outlined below.  Your care team is available to assist you in scheduling these services.  If you have already completed any of these items, please share that information with your care team to update in your medical record.  Health Maintenance   Topic Date Due   ? ZOSTER VACCINES (2 of 3) 09/10/2010   ? FALL RISK ASSESSMENT  04/25/2019   ? COLONOSCOPY  02/22/2022   ? TD 18+ HE  09/20/2022   ? ADVANCE DIRECTIVES DISCUSSED WITH PATIENT  04/25/2023   ? PNEUMOCOCCAL POLYSACCHARIDE VACCINE AGE 65 AND OVER  Completed   ? INFLUENZA VACCINE RULE BASED   Completed   ? PNEUMOCOCCAL CONJUGATE VACCINE FOR ADULTS (PCV13 OR PREVNAR)  Completed

## 2021-06-17 NOTE — TELEPHONE ENCOUNTER
Telephone Encounter by Barb Flores at 2/15/2021  9:08 AM     Author: Barb Flores Service: -- Author Type: --    Filed: 2/15/2021  9:11 AM Encounter Date: 2/15/2021 Status: Signed    : Barb Flores

## 2021-06-17 NOTE — PROGRESS NOTES
Assessment and Plan:   1 annual wellness visit no major new diagnoses  2 significant hyper lipidemia with LDL greater than 200 and family history; unable to take statins; will discuss getting a CT calcium score and consider the new injectable cholesterol lowering medication; Repatha- will need to get cards involved    Problem List Items Addressed This Visit     Hyperlipemia    Relevant Orders    Comprehensive Metabolic Panel (Completed)    HM2(CBC w/o Differential) (Completed)    Lipid Cascade (Completed)    Thyroid Stimulating Hormone (TSH) (Completed)    CT Cardiac Calcium Score    Benign essential HTN      Other Visit Diagnoses     Healthcare maintenance    -  Primary    Prostate cancer screening        Relevant Orders    PSA (Prostatic-Specific Antigen), Annual Screen (Completed)    Vitamin D deficiency        Relevant Orders    Vitamin D, Total (25-Hydroxy) (Completed)    FH: heart disease        Relevant Orders    CT Cardiac Calcium Score            The patient's current medical problems were reviewed.    I have had an Advance Directives discussion with the patient.  The following health maintenance schedule was reviewed with the patient and provided in printed form in the after visit summary:   Health Maintenance   Topic Date Due     FALL RISK ASSESSMENT  04/25/2019     COLONOSCOPY  02/22/2022     ADVANCE DIRECTIVES DISCUSSED WITH PATIENT  03/29/2022     TD 18+ HE  09/20/2022     PNEUMOCOCCAL POLYSACCHARIDE VACCINE AGE 65 AND OVER  Completed     INFLUENZA VACCINE RULE BASED  Completed     PNEUMOCOCCAL CONJUGATE VACCINE FOR ADULTS (PCV13 OR PREVNAR)  Completed     ZOSTER VACCINE  Completed        Subjective:   Chief Complaint: Issac Stewart is an 67 y.o. male here for an Annual Wellness visit.   HPI:    And he is doing remarkably well overall.  He is a very aggressive exercise enthusiast he monitors his weight and diet despite this he has a very high cholesterol see below he also has a family history of  early onset vascular disease although his dad lived into his 90s he had dementia at the end otherwise no major issues today he cannot tolerate statins several attempts have failed    Review of Systems:  Please see above.  The rest of the review of systems are negative for all systems.    Patient Care Team:  Daniel Gann MD as PCP - General  Sarwat Obregon MD as Physician (Orthopedic Surgery)     Patient Active Problem List   Diagnosis     Fibromuscular Arterial Hyperplasia     Renal Insufficiency     Abnormal Electrocardiogram     Muscle Aches, Generalized (Myalgias)     Hyperlipemia     Benign essential HTN     Past Medical History:   Diagnosis Date     Abnormal EKG 2007    stress equivocal; cardiac MRI ok; inverted T waves     CKD (chronic kidney disease), stage II     creat 1.3-1.5 range     Hyperlipidemia     intolerant statins     Hypertension      Renal artery stenosis due to fibromuscular dysplasia 2010    minimal      Past Surgical History:   Procedure Laterality Date     COLONOSCOPY  2007    normal q 10 yr plan     FOOT SURGERY Left 2006    Gan's neuroma     LIPECTOMY  2007    multiple lipomas excised     LIPOMA RESECTION  11/15/2016    DR. RUSH      Family History   Problem Relation Age of Onset     Heart failure Father      Stroke Father       Social History     Social History     Marital status:      Spouse name: N/A     Number of children: N/A     Years of education: N/A     Occupational History      [employer]     retired      Social History Main Topics     Smoking status: Never Smoker     Smokeless tobacco: Never Used     Alcohol use Not on file     Drug use: Not on file     Sexual activity: Not on file     Other Topics Concern     Not on file     Social History Narrative    Retired () [employer].  2 grown kids; dad (Dylan) long term [facility] patient-. Non smoker/rare ETOH. Very avid exercise enthusiast.  "Bikes/golf/. 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       aspirin (ASPIRIN LOW DOSE) 81 MG EC tablet        ERGOCALCIFEROL, VITAMIN D2, (VITAMIN D2 ORAL) Take by mouth.       GLUCOSAMINE/CHONDROITIN SULF A (GLUCOSAMINE-CHONDROITIN ORAL) Take by mouth.       losartan-hydrochlorothiazide (HYZAAR) 50-12.5 mg per tablet TAKE 1 TABLET BY MOUTH TWICE DAILY 180 tablet 0     MULTIVITS/IRON FUM/FA/D3/LYCOP (MULTI FOR HIM ORAL) Take by mouth.       spironolactone (ALDACTONE) 25 MG tablet TAKE 1 TABLET BY MOUTH DAILY 90 tablet 0     No current facility-administered medications for this visit.       Objective:   Vital Signs:   Visit Vitals     /78 (Patient Site: Left Arm, Patient Position: Sitting, Cuff Size: Adult Regular)     Pulse 60     Ht 6' 0.5\" (1.842 m)     Wt 209 lb (94.8 kg)     BMI 27.96 kg/m2        VisionScreening:  No exam data present     PHYSICAL EXAM  Physical Exam:  General-healthy-appearing man looks much younger than stated age excellent muscle tone and development  VS- see above  HEENT- neg   Neck- no adenopathy/thyromegaly/bruits  Chest- clear some scars over his anterior chest near right nipple from lipoma excisions  Cor- reg no murmurs/gallops/ectopics  Extremities: no edema, good distal pulses  Neuro- Cr. NN-  intact, alert,   Abdomen- soft non tender, no masses; no organomegaly  Skin- no suspicious lesions for malignancy  Lymph- no pathologic nodes in neck/axilla/groin  Musculoskeletal-    Recent Results (from the past 240 hour(s))   Comprehensive Metabolic Panel   Result Value Ref Range    Sodium 141 136 - 145 mmol/L    Potassium 4.3 3.5 - 5.0 mmol/L    Chloride 104 98 - 107 mmol/L    CO2 25 22 - 31 mmol/L    Anion Gap, Calculation 12 5 - 18 mmol/L    Glucose 88 70 - 125 mg/dL    BUN 30 (H) 8 - 22 mg/dL    Creatinine 1.41 (H) 0.70 - 1.30 mg/dL    GFR " MDRD Af Amer 60 (L) >60 mL/min/1.73m2    GFR MDRD Non Af Amer 50 (L) >60 mL/min/1.73m2    Bilirubin, Total 0.6 0.0 - 1.0 mg/dL    Calcium 9.9 8.5 - 10.5 mg/dL    Protein, Total 7.2 6.0 - 8.0 g/dL    Albumin 3.8 3.5 - 5.0 g/dL    Alkaline Phosphatase 77 45 - 120 U/L    AST 23 0 - 40 U/L    ALT 29 0 - 45 U/L   HM2(CBC w/o Differential)   Result Value Ref Range    WBC 6.7 4.0 - 11.0 thou/uL    RBC 4.78 4.40 - 6.20 mill/uL    Hemoglobin 14.9 14.0 - 18.0 g/dL    Hematocrit 43.6 40.0 - 54.0 %    MCV 91 80 - 100 fL    MCH 31.1 27.0 - 34.0 pg    MCHC 34.1 32.0 - 36.0 g/dL    RDW 12.5 11.0 - 14.5 %    Platelets 192 140 - 440 thou/uL    MPV 9.1 7.0 - 10.0 fL   Lipid Cascade   Result Value Ref Range    Cholesterol 283 (H) <=199 mg/dL    Triglycerides 48 <=149 mg/dL    HDL Cholesterol 61 >=40 mg/dL    LDL Calculated 212 (H) <=129 mg/dL    Patient Fasting > 8hrs? Yes    Thyroid Stimulating Hormone (TSH)   Result Value Ref Range    TSH 1.58 0.30 - 5.00 uIU/mL   PSA (Prostatic-Specific Antigen), Annual Screen   Result Value Ref Range    PSA 3.4 0.0 - 4.5 ng/mL   Vitamin D, Total (25-Hydroxy)   Result Value Ref Range    Vitamin D, Total (25-Hydroxy) 67.1 30.0 - 80.0 ng/mL         Assessment Results 4/25/2018   Activities of Daily Living No help needed   Instrumental Activities of Daily Living No help needed   Get Up and Go Score Less than 12 seconds   Mini Cog Total Score 4   Some recent data might be hidden     A Mini-Cog score of 0-2 suggests the possibility of dementia, score of 3-5 suggests no dementia    Identified Health Risks:     The patient was counseled and encouraged to consider modifying their diet and eating habits. He was provided with information on recommended healthy diet options.  Patient's advanced directive was discussed and I am comfortable with the patient's wishes.

## 2021-06-18 NOTE — PATIENT INSTRUCTIONS - HE
"Patient Instructions by Saadia Morocho PT at 10/14/2020  2:00 PM     Author: Saadia Morocho PT Service: -- Author Type: Physical Therapist    Filed: 10/14/2020  2:33 PM Encounter Date: 10/14/2020 Status: Addendum    : Saadia Morocho PT (Physical Therapist)    Related Notes: Original Note by Saadia Morocho PT (Physical Therapist) filed at 10/14/2020  2:31 PM       Continue being active but avoid activities that make you painful            SEATED HAMSTRING STRETCH    While seated, rest your heel on the floor with your knee straight and gently lean forward until a stretch is felt behind you knee/thigh. Hold for 30 seconds. Do 2-3 repetitions at a time. Perform 1-2x/day      SEATED LOW BACK STRETCH    While sitting in a chair, slowly bend forward and reach your hands for the floor. Bend your trunk and head forward and down. Hold for 30 seconds. Do 2-3 repetitions at a time. Perform 1-2x/day            ARTIE POSE WITH SIDE STRETCH    Sit back on your heels with knees apart. Reach forward walking your hands on the mat/ground in front of you. You can also walk your hands out to either side while sitting back on your heels to get a stretch on the side of your low back/trunk.    Hold 30 seconds, breathing deeply further into the stretch.    Repeat 2-3 times in each direction.    Perform 1-2x/day      QUADRUPED ARM/LEG LIFT    Start on hands and knees while keeping your spine flat and neutral.  Tighten abdominal muscles.  Raise leg back and opposite arm and hold 3-5 seconds.  Return to original position and alternate. Do 10 repetitions on each side. Perform 1x/day or every other day      BRIDGING    While lying on your back, tighten your lower abdominals, squeeze your buttocks and then raise your buttocks off the floor/bed as creating a \"Bridge\" with your body. Hold for 5-10 seconds. Do 15-20 repetitions, 1x/day or every other day.          "

## 2021-06-20 NOTE — LETTER
Letter by Daniel Mercado MD at      Author: Daniel Mercado MD Service: -- Author Type: --    Filed:  Encounter Date: 9/15/2020 Status: (Other)          Issac Stewart  [address] [facility] [address]     September 15, 2020     Dear Mr. tSewart,    Below are the results from your recent visit:    Resulted Orders   Basic Metabolic Panel   Result Value Ref Range    Sodium 140 136 - 145 mmol/L    Potassium 3.6 3.5 - 5.0 mmol/L    Chloride 103 98 - 107 mmol/L    CO2 29 22 - 31 mmol/L    Anion Gap, Calculation 8 5 - 18 mmol/L    Glucose 93 70 - 125 mg/dL    Calcium 9.4 8.5 - 10.5 mg/dL    BUN 21 8 - 28 mg/dL    Creatinine 1.04 0.70 - 1.30 mg/dL    GFR MDRD Af Amer >60 >60 mL/min/1.73m2    GFR MDRD Non Af Amer >60 >60 mL/min/1.73m2    Narrative    Fasting Glucose reference range is 70-99 mg/dL per  American Diabetes Association (ADA) guidelines.   Lipid Profile   Result Value Ref Range    Triglycerides 68 <=149 mg/dL    Cholesterol 185 <=199 mg/dL    LDL Calculated 120 <=129 mg/dL    HDL Cholesterol 51 >=40 mg/dL    Patient Fasting > 8hrs? Yes    HM1 (CBC with Diff)   Result Value Ref Range    WBC 5.3 4.0 - 11.0 thou/uL    RBC 4.67 4.40 - 6.20 mill/uL    Hemoglobin 14.5 14.0 - 18.0 g/dL    Hematocrit 43.0 40.0 - 54.0 %    MCV 92 80 - 100 fL    MCH 31.0 27.0 - 34.0 pg    MCHC 33.6 32.0 - 36.0 g/dL    RDW 13.0 11.0 - 14.5 %    Platelets 193 140 - 440 thou/uL    MPV 9.3 7.0 - 10.0 fL    Neutrophils % 60 50 - 70 %    Lymphocytes % 28 20 - 40 %    Monocytes % 9 2 - 10 %    Eosinophils % 3 0 - 6 %    Basophils % 1 0 - 2 %    Neutrophils Absolute 3.2 2.0 - 7.7 thou/uL    Lymphocytes Absolute 1.5 0.8 - 4.4 thou/uL    Monocytes Absolute 0.5 0.0 - 0.9 thou/uL    Eosinophils Absolute 0.2 0.0 - 0.4 thou/uL    Basophils Absolute 0.0 0.0 - 0.2 thou/uL       Your tests are all good.      Please call with questions or contact us using [employer].    Sincerely,        Electronically signed by Daniel Mercado MD

## 2021-06-20 NOTE — LETTER
Letter by Sarwat Kirby, PharmADRIEL at      Author: Sarwat Kirby, Christie Service: -- Author Type: --    Filed:  Encounter Date: 3/6/2020 Status: (Other)             3/16/2020      Issac Stewart  [address] [facility] [address]            Dear Issac Stewart     Thank you for talking with me on 2020 about your health and medications. [facility] MTM (Medication Therapy Management) program helps you understand your medications and use them safely.    Along with this letter are an action plan (Medication Action Plan) and a medication list (Personal Medication List). The action plan has steps you should take to help you get the best results from your medications. The medication list will help you keep track of your medications and how to use them the right way.       Have your action plan and medication list with you when you talk with your doctors, pharmacists, and other health care providers.    Ask your doctors, pharmacists, and other healthcare providers to update them at every visit.     Take your medication list with you if you go to the hospital or emergency room.     Give a copy of the action plan and medication list to your family or caregivers.     If you want to talk about this letter or any of the papers with it, please call [phone]. We look forward to working with you, your doctors, and other healthcare providers to help you stay healthy.    Sincerely,     Sarwat Kirby    _  Medication Action Plan For Issac Stewart, : 1950     This action plan will help you get the best results from your medications if you:     1. Read What we talked about.   2. Take the steps listed in the What I need to do boxes.   3. Fill in What I did and when I did it.   4. Fill in My follow-up plan and Questions I want to ask.     Have this action plan with you when you talk with your doctors, pharmacists, and other healthcare providers in your care team. Share this with your family or  caregivers too.    Date Prepared: 3/16/2020      What we talked about:  Hyperlipidemia     What I need to do:  Due to statin intolerance, we will try to get Repatha covered for you through insurance, administered every other week     What I did and when I did it:               My follow-up plan (add notes about next steps):            Questions I want to ask (include topics about medications or therapy):          If you have any questions about your action plan, call Sarwat ALAN Kirby at [phone], Monday-Friday 8:00-4:30pm  _  This medication list was made for you after we talked. We also used information from your doctors chart.      Use blank rows to add new medications. Then fill in the dates you started using them.     Cross out medications when you no longer use them. Then write the date and why you stopped using them.     Ask your doctors, pharmacists, and other healthcare providers to update this list at every visit.  Keep this list up-to-date with:  ? prescription medications  ? over the counter drugs  ? herbals  ? vitamins  ? minerals          If you go to the hospital or emergency room, take this list with you. Share this with your family or caregivers too.    Date Prepared: 3/16/2020      Allergies or side effects: No Known Allergies      Medication: alirocumab (PRALUENT PEN) 75 mg/mL PnIj      How I use it: Inject 75 mg under the skin every 14 (fourteen) days.      Why I use it: Hyperlipidemia, unspecified hyperlipidemia type    Prescriber: Daniel Mercado MD      Date I started using it:     Date I stopped using it:       Why I stopped using it:          Medication: ASCORBATE CALCIUM (VITAMIN C ORAL)      How I use it: Take 1,000 mg by mouth daily.      Why I use it:  General health    Prescriber: Daniel Mercado MD      Date I started using it:     Date I stopped using it:       Why I stopped using it:          Medication: aspirin (ASPIRIN LOW DOSE) 81 MG EC tablet      How I use it: Take 1 tablet by  mouth daily.      Why I use it:  Preventing heart disease    Prescriber: Daniel Mercado MD      Date I started using it:     Date I stopped using it:       Why I stopped using it:          Medication: ERGOCALCIFEROL, VITAMIN D2, (VITAMIN D2 ORAL)      How I use it: Take 2,000 Units by mouth daily.      Why I use it:  General health    Prescriber: Daniel Mercado MD      Date I started using it:     Date I stopped using it:       Why I stopped using it:          Medication: evolocumab (REPATHA SURECLICK) 140 mg/mL PnIj      How I use it: Inject 140 mg under the skin every 14 (fourteen) days.      Why I use it: Hyperlipidemia, unspecified hyperlipidemia type; Statin intolerance    Prescriber: Daniel Mercado MD      Date I started using it:     Date I stopped using it:       Why I stopped using it:          Medication: fish oil-omega-3 fatty acids 300-1,000 mg capsule      How I use it: Take 1 g by mouth daily.      Why I use it:  General health    Prescriber: Daniel Mercado MD      Date I started using it:     Date I stopped using it:       Why I stopped using it:          Medication: hydroCHLOROthiazide (MICROZIDE) 12.5 mg capsule      How I use it: Take 1 capsule (12.5 mg total) by mouth daily.      Why I use it: Essential Hypertension    Prescriber: Kristen Boyce MD      Date I started using it:     Date I stopped using it:       Why I stopped using it:          Medication: Lactobacillus rhamnosus GG (CULTURELLE) 15 billion cell CpSP      How I use it: Take 1 capsule by mouth daily.      Why I use it:  General health    Prescriber: Daniel Mercado MD      Date I started using it:     Date I stopped using it:       Why I stopped using it:          Medication: multivitamin therapeutic tablet      How I use it: Take 1 tablet by mouth daily.      Why I use it:  General health    Prescriber: Daniel Mercado MD      Date I started using it:     Date I stopped using it:       Why I stopped using it:           Medication: ubidecarenone (COENZYME Q10) 100 mg Tab tablet      How I use it: Take 100 mg by mouth daily.      Why I use it:  General health    Prescriber: Daniel Mercado MD      Date I started using it:     Date I stopped using it:       Why I stopped using it:          Medication: valsartan (DIOVAN) 160 MG tablet      How I use it: Take 1 tablet (160 mg total) by mouth daily.      Why I use it: HTN (Hypertension)    Prescriber: Daniel Mercado MD      Date I started using it:     Date I stopped using it:       Why I stopped using it:          Medication: zinc gluconate 50 mg tablet      How I use it: Take 50 mg by mouth daily.      Why I use it:  General health    Prescriber: Daniel Mercado MD      Date I started using it:     Date I stopped using it:       Why I stopped using it:          Medication:       How I use it:       Why I use it:     Prescriber:       Date I started using it:     Date I stopped using it:       Why I stopped using it:          Medication:       How I use it:       Why I use it:     Prescriber:       Date I started using it:     Date I stopped using it:       Why I stopped using it:          Medication:       How I use it:       Why I use it:     Prescriber:       Date I started using it:     Date I stopped using it:       Why I stopped using it:          Other Information:      If you have any questions about your medication list, call [phone]    According to the Paperwork Reduction Act of 1995, no persons are required to respond to a collection of information unless it displays a valid OMB control number. The valid OMB number for this information collection is [id number]. The time required to complete this information collection is estimated to average 37.76 minutes per response, including the time to review instructions, searching existing data resources, gather the data needed, and complete and review the information collection. If you have any comments concerning the accuracy  of the time estimate(s) or suggestions for improving this form, please write to: CMS, Attn: [employer] Clearance Officer, [address] [place] [address], [place], [place] [address].

## 2021-06-23 NOTE — PROGRESS NOTES
Assessment and Plan:   1 annual wellness visit no new diagnoses  2 high cholesterol with statin intolerance but calcium score for coronaries low 25%  3 routine labs today    Problem List Items Addressed This Visit     Hyperlipemia    Relevant Orders    Lipid Cascade (Completed)    Comprehensive Metabolic Panel (Completed)    HM2(CBC w/o Differential) (Completed)    Thyroid Stimulating Hormone (TSH)      Other Visit Diagnoses     Healthcare maintenance    -  Primary    Mild vitamin D deficiency        Relevant Orders    Vitamin D, Total (25-Hydroxy)    Screening for prostate cancer        Relevant Orders    PSA (Prostatic-Specific Antigen), Annual Screen (Completed)            The patient's current medical problems were reviewed.    I have had an Advance Directives discussion with the patient.  The following health maintenance schedule was reviewed with the patient and provided in printed form in the after visit summary:   Health Maintenance   Topic Date Due     ZOSTER VACCINES (2 of 3) 09/10/2010     FALL RISK ASSESSMENT  04/25/2019     COLONOSCOPY  02/22/2022     TD 18+ HE  09/20/2022     ADVANCE DIRECTIVES DISCUSSED WITH PATIENT  04/25/2023     PNEUMOCOCCAL POLYSACCHARIDE VACCINE AGE 65 AND OVER  Completed     INFLUENZA VACCINE RULE BASED  Completed     PNEUMOCOCCAL CONJUGATE VACCINE FOR ADULTS (PCV13 OR PREVNAR)  Completed        Subjective:   Chief Complaint: Issac Stewart is an 68 y.o. male here for an Annual Wellness visit.   HPI: He is here for annual physical he is doing remarkably well or concerns he tolerates his medications he works out on a regular almost daily basis he does not tolerate statin medication although was last calcium chest coronary score was in the low 25% range    Review of Systems:    Please see above.  The rest of the review of systems are negative for all systems.    Patient Care Team:  Daniel Gann MD as PCP - General  Sarwat Obregon MD as Physician (Orthopedic  Surgery)     Patient Active Problem List   Diagnosis     Fibromuscular Arterial Hyperplasia     Renal Insufficiency     Abnormal Electrocardiogram     Muscle Aches, Generalized (Myalgias)     Hyperlipemia     Benign essential HTN     Past Medical History:   Diagnosis Date     Abnormal EKG 2007    stress equivocal; cardiac MRI ok; inverted T waves     CKD (chronic kidney disease), stage II     creat 1.3-1.5 range     Hyperlipidemia     intolerant statins     Hypertension      Renal artery stenosis due to fibromuscular dysplasia (H) 2010    minimal      Past Surgical History:   Procedure Laterality Date     COLONOSCOPY  2007    normal q 10 yr plan     FOOT SURGERY Left 2006    Gan's neuroma     LIPECTOMY  2007    multiple lipomas excised     LIPOMA RESECTION  11/15/2016    DR. RUSH      Family History   Problem Relation Age of Onset     Heart failure Father      Stroke Father       Social History     Socioeconomic History     Marital status:      Spouse name: Not on file     Number of children: Not on file     Years of education: Not on file     Highest education level: Not on file   Social Needs     Financial resource strain: Not on file     Food insecurity - worry: Not on file     Food insecurity - inability: Not on file     Transportation needs - medical: Not on file     Transportation needs - non-medical: Not on file   Occupational History     Occupation:      Employer: [employer]     Comment: retired    Tobacco Use     Smoking status: Never Smoker     Smokeless tobacco: Never Used   Substance and Sexual Activity     Alcohol use: Not on file     Drug use: Not on file     Sexual activity: Not on file   Other Topics Concern     Not on file   Social History Narrative    Retired () [employer].  2 grown kids; dad (Dylan) long term [place] patient-. Non smoker/rare ETOH. Very avid exercise enthusiast. Bikes/golf/. Works part time in  longterm at [employer] on [employer]      Current Outpatient Medications   Medication Sig Dispense Refill     ASCORBATE CALCIUM (VITAMIN C ORAL) Take by mouth.       aspirin (ASPIRIN LOW DOSE) 81 MG EC tablet        ERGOCALCIFEROL, VITAMIN D2, (VITAMIN D2 ORAL) Take by mouth.       GLUCOSAMINE/CHONDROITIN SULF A (GLUCOSAMINE-CHONDROITIN ORAL) Take by mouth.       losartan-hydrochlorothiazide (HYZAAR) 50-12.5 mg per tablet TAKE 1 TABLET BY MOUTH TWICE DAILY 180 tablet 1     MULTIVITS/IRON FUM/FA/D3/LYCOP (MULTI FOR HIM ORAL) Take by mouth.       spironolactone (ALDACTONE) 25 MG tablet TAKE 1 TABLET(25 MG) BY MOUTH DAILY 90 tablet 1     No current facility-administered medications for this visit.       Objective:   Vital Signs:   Visit Vitals  /86 (Patient Site: Left Arm, Patient Position: Sitting, Cuff Size: Adult Regular)   Pulse (!) 58   Resp 14   Ht 6' (1.829 m)   Wt 204 lb (92.5 kg)   SpO2 97%   BMI 27.67 kg/m         VisionScreening:  No exam data present     PHYSICAL EXAM  PE:  General-muscle bound man looks much younger than stated age  VS- see above  HEENT- neg   Neck- no adenopathy/thyromegaly/bruits  Chest- clear   Cor- reg no murmurs/gallops/ectopics  Extremities: no edema, good distal pulses  Neuro- Cr. NN-  intact, alert,   Abdomen- soft non tender, no masses; no organomegaly  Skin- no suspicious lesions for malignancy  Lymph- no pathologic nodes in neck/axilla/groin  Musculoskeletal-  Breasts: -      Assessment Results 1/17/2019   Activities of Daily Living No help needed   Instrumental Activities of Daily Living No help needed   Get Up and Go Score Less than 12 seconds   Mini Cog Total Score 5   Some recent data might be hidden     A Mini-Cog score of 0-2 suggests the possibility of dementia, score of 3-5 suggests no dementia    Identified Health Risks:     The patient was counseled and encouraged to consider modifying their diet and eating habits. He was provided with information on  recommended healthy diet options.  Patient's advanced directive was discussed and I am comfortable with the patient's wishes.    Recent Results (from the past 240 hour(s))   Lipid Cascade   Result Value Ref Range    Cholesterol 267 (H) <=199 mg/dL    Triglycerides 59 <=149 mg/dL    HDL Cholesterol 61 >=40 mg/dL    LDL Calculated 194 (H) <=129 mg/dL    Patient Fasting > 8hrs? Yes    Comprehensive Metabolic Panel   Result Value Ref Range    Sodium 140 136 - 145 mmol/L    Potassium 3.9 3.5 - 5.0 mmol/L    Chloride 104 98 - 107 mmol/L    CO2 26 22 - 31 mmol/L    Anion Gap, Calculation 10 5 - 18 mmol/L    Glucose 93 70 - 125 mg/dL    BUN 33 (H) 8 - 28 mg/dL    Creatinine 1.20 0.70 - 1.30 mg/dL    GFR MDRD Af Amer >60 >60 mL/min/1.73m2    GFR MDRD Non Af Amer 60 (L) >60 mL/min/1.73m2    Bilirubin, Total 1.1 (H) 0.0 - 1.0 mg/dL    Calcium 9.8 8.5 - 10.5 mg/dL    Protein, Total 6.8 6.0 - 8.0 g/dL    Albumin 3.8 3.5 - 5.0 g/dL    Alkaline Phosphatase 64 45 - 120 U/L    AST 19 0 - 40 U/L    ALT 24 0 - 45 U/L   HM2(CBC w/o Differential)   Result Value Ref Range    WBC 5.1 4.0 - 11.0 thou/uL    RBC 4.79 4.40 - 6.20 mill/uL    Hemoglobin 14.5 14.0 - 18.0 g/dL    Hematocrit 43.4 40.0 - 54.0 %    MCV 91 80 - 100 fL    MCH 30.4 27.0 - 34.0 pg    MCHC 33.5 32.0 - 36.0 g/dL    RDW 12.4 11.0 - 14.5 %    Platelets 200 140 - 440 thou/uL    MPV 9.2 7.0 - 10.0 fL   PSA (Prostatic-Specific Antigen), Annual Screen   Result Value Ref Range    PSA 2.8 0.0 - 6.5 ng/mL

## 2021-06-23 NOTE — TELEPHONE ENCOUNTER
Pt informed that typically pts over 65 the shingrix is only covered at the pharmacy   Pt informed that if the shingrix is not covered in clinic it may cost up $400 per shot and it is a 2 shot series    Pt will check around with the pharmacies and will call back

## 2021-06-23 NOTE — TELEPHONE ENCOUNTER
New Appointment Needed  What is the reason for the visit:    Shingrix #1. Patient has not checked if it is covered by insurance but states he wants the vaccine either way.   Provider Preference: Nurse.  How soon do you need to be seen?: Any. Patient schedule is pretty wide open. Prefers early AM if possible.   Waitlist offered?: N/A  Okay to leave a detailed message:  Yes

## 2021-06-29 NOTE — PROGRESS NOTES
Progress Notes by Saadia Morocho PT at 10/14/2020  2:00 PM     Author: Saadia Morocho PT Service: -- Author Type: Physical Therapist    Filed: 10/14/2020  2:50 PM Encounter Date: 10/14/2020 Status: Attested    : Saadia Morocho PT (Physical Therapist) Cosigner: Venus Barrera PA-C at 10/14/2020  2:50 PM    Attestation signed by Venus Barrera PA-C at 10/14/2020  2:50 PM    Continue POC                    [facility] Certification Request    October 14, 2020      Patient: Issac Stewart  MR Number: [id number]  YOB: 1950  Date of Visit: 10/14/2020      Dear Venus Barrear,    Thank you for this referral.   We are seeing Issac Stewart for Physical Therapy of back pain.    Medicare and/or Medicaid requires physician review and approval of the treatment plan. Please review the plan of care and verify that you agree with the therapy plan of care by co-signing this note.      Plan of Care  Authorization / Certification Start Date: 10/14/20  Authorization / Certification End Date: 01/14/21  Authorization / Certification Number of Visits: 8  Communication with: Referral Source  Patient Related Instruction: Precautions;Next steps;Nature of Condition;Treatment plan and rationale;Self Care instruction;Basis of treatment;Expected outcome;Body mechanics;Posture  Times per Week: 1  Number of Weeks: 12  Number of Visits: 8  Discharge Planning: when PT goals are met  Precautions / Restrictions : none per chart  Therapeutic Exercise: ROM;Stretching;Strengthening  Neuromuscular Reeducation: core;posture  Manual Therapy: soft tissue mobilization;myofascial release;joint mobilization;muscle energy  Equipment: theraband      Goals:  Pt. will demonstrate/verbalize independence in self-management of condition in : 12 weeks  Pt. will be independent with home exercise program in : 6 weeks    Pt will: have less pain in the mornings for increased quality of life within 8 weeks.        If you have any  questions or concerns, please don't hesitate to call.    Sincerely,      Saadia Morocho, PT, DPT        Physician recommendation:     ___ Follow therapist's recommendation        ___ Modify therapy      *Physician co-signature indicates they certify the need for these services furnished within this plan and while under their care.      [facility]  [place]-Pelvic Initial Evaluation    Patient Name: Issac Stewart  Date of evaluation: 10/14/2020  Referral Diagnosis: back pain  Referring provider: Venus Barrera PA-C  Visit Diagnosis:     ICD-10-CM    1. Chronic right-sided low back pain with right-sided sciatica  M54.41     G89.29    2. Muscle tightness  M62.89    3. Generalized muscle weakness  M62.81        Past Medical History:   Diagnosis Date   ? Abnormal EKG 2007    stress equivocal; cardiac MRI ok; inverted T waves   ? Adenomatous colon polyp    ? CKD (chronic kidney disease), stage II 2015    creat 1.3-1.5 range   ? Hyperlipidemia     intolerant statins   ? Hypertension    ? Lumbar spondylosis    ? Renal artery stenosis due to fibromuscular dysplasia (H) 2010    minimal   ? Spinal stenosis of lumbar region with neurogenic claudication        Assessment:      Issac Stewart is a 69 y.o. male who presents to therapy today with chief complaints of chronic low back pain. Symptoms began a year and 1/2 years ago. Difficulty with waking up in the mornings due to about 45 minutes of back pain. After moving around for about 45 minutes, the pain subsides and he's fine the rest of the day. He has noticed weakness in his legs while cycling as well.  Pain symptoms are not improving.  Patient demonstrates signs and sx consistent with lumbar stenosis (see imaging for details). PT POC and goals have been discussed with patient and He  is agreeable to these. Patient appears motivated for physical therapy and is appropriate for skilled therapy services.    The POC is dynamic and will be modified on an ongoing  basis.  Barriers to achieving goals as noted in the assessment section may affect outcome.  Prognosis to achieve goals is  fair   Pt. is appropriate for skilled PT intervention as outlined in the Plan of Care (POC).  Pt. is a good candidate for skilled PT services to improve pain levels and function.    Goals:  Pt. will demonstrate/verbalize independence in self-management of condition in : 12 weeks  Pt. will be independent with home exercise program in : 6 weeks    Pt will: have less pain in the mornings for increased quality of life within 8 weeks.      Patient's expectations/goals are realistic.    Barriers to Learning or Achieving Goals:  Chronicity of the problem.       Plan / Patient Instructions:      Plan of Care:   Authorization / Certification Start Date: 10/14/20  Authorization / Certification End Date: 01/14/21  Authorization / Certification Number of Visits: 8  Communication with: Referral Source  Patient Related Instruction: Precautions;Next steps;Nature of Condition;Treatment plan and rationale;Self Care instruction;Basis of treatment;Expected outcome;Body mechanics;Posture  Times per Week: 1  Number of Weeks: 12  Number of Visits: 8  Discharge Planning: when PT goals are met  Precautions / Restrictions : none per chart  Therapeutic Exercise: ROM;Stretching;Strengthening  Neuromuscular Reeducation: core;posture  Manual Therapy: soft tissue mobilization;myofascial release;joint mobilization;muscle energy  Equipment: theraband      POC and pathology of condition were reviewed with patient.  Pt. is in agreement with the Plan of Care  A Home Exercise Program (HEP) was initiated today.  Pt. was instructed in exercises by PT and patient was given a handout with detailed instructions.    Plan for next visit: continue w/ core strengthening- clamshell w/ L3, hip add, single leg squat off of stair     Subjective:       Social information:   Occupation: retired   Hobbies: cycling, kettle bells, gets personal  training for stretching and core work several days/week(dead bug, reach and roll, toe touching)     History of Present Illness:    Issac Stewart is a 69 y.o. male who presents to therapy today with complaints of chronic pain in his low back. Date of onset/duration of symptoms is 1 1/2 years ago. Onset was sudden. Symptoms are intermittent and not improving. Mornings are the worst. He hobbles around in the morning for about 45 minutes and then feels fine the rest of the day. No particular activities his pain. Walking around helps. Isn't icing or using heat. Did have back pain in the 80's but hasn't had any other pain before a year and a half ago.     Pain Ratin  Pain rating at best: 0  Pain rating at worst: 8  Pain description: sharp and constant    Functional limitations are described as occurring with: none per patient       Objective:      Note: Items left blank indicates the item was not performed or not indicated at the time of the evaluation.    Patient Outcome Measures :    Modified Oswestry Low Back Pain Disablity Questionnaire  in %: 4     Scores range from 0-100%, where a score of 0% represents minimal pain and maximal function. The minimal clinically important difference is a score reduction of 12%.    Examination  1. Chronic right-sided low back pain with right-sided sciatica     2. Muscle tightness     3. Generalized muscle weakness         Involved side: Right  Posture Observation: fair    Lumbar ROM:  All WNL  Date:    *Indicate scale AROM   Lumbar Flexion    Lumbar Extension     Right Left   Lumbar Sidebending Some mild pulling on R lower back    Lumbar Rotation     Thoracic Flexion    Thoracic Extension    Thoracic Sidebending     Thoracic Rotation       Lower Extremity Strength:   WNL B  Date:    LE strength/5 Right Left   Hip Flexion (L1-3)     Hip Extension (L5-S1)     Hip Abduction (L4-5)     Hip Adduction (L2-3)     Hip External Rotation     Hip Internal Rotation     Knee Extension (L3-4)   "   Knee Flexion     Ankle Dorsiflexion (L4-5)     Great Toe Extension (L5)     Ankle Plantar flexion (S1)     Abdominals      Sensation    Intact per subjective       Reflex Testing  Lumbar Dermatomes Right Left UE Reflexes Right Left   Iliac Crest and Groin (L1)   Biceps (C5-6)     Anterior Medial Thigh (L2)   Brachioradialis (C5-6)     Anterior Thigh, Medial Epicondyle Femur (L3)   Triceps (C7-8)     Lateral Thigh, Anterior Knee, Medial Leg/Malleolus (L4)   Hoffmanns test     Lateral Leg, Dorsal Foot (L5)   LE Reflexes     Lateral Foot (S1)   Patellar (L3-4)     Posterior Leg (S2)   Achilles (S1-2)     Other:   Babinski Response       Palpation: no tenderness    Lumbar Special Tests:     Lumbar Special Tests Right Left SI Tests Right  Left   Quadrant test   SI Compression     Straight leg raise - - SI Distraction     Crossover response - - POSH Test - -   Slump - - Sacral Thrust - -   Sit-up test  NASIR - -   Trunk extensor endurance test  Resisted Abduction     Prone instability test  Other: hip scour - -   Pubic shotgun  Other:         LE Screen:  B hip PROM: all WFL, pain-free    Treatment Today     TREATMENT MINUTES COMMENTS   Evaluation 22 Discussed PT POC and pathology of condition.    Self-care/ Home management     Manual therapy     Neuromuscular Re-education     Therapeutic Activity     Therapeutic Exercises 8 Began HEP:  Exercises:  Exercise #1: lumbar flexion in chair 30\" holds  Comment #1: child's pose- forward and to the sides 30\" holds  Exercise #2: bird/dog  Comment #2: bridge      Gait training     Modality__________________                Total 30    Blank areas are intentional and mean the treatment did not include these items.          PT Evaluation Code: (Please list factors)  Patient History/Comorbidities: see PMH  Examination: chronic LBP w/ R sided sciatica  Clinical Presentation: stable  Clinical Decision Making: low    Patient History/  Comorbidities Examination  (body structures and " functions, activity limitations, and/or participation restrictions) Clinical Presentation Clinical Decision Making (Complexity)   No documented Comorbidities or personal factors 1-2 Elements Stable and/or uncomplicated Low   1-2 documented comorbidities or personal factor 3 Elements Evolving clinical presentation with changing characteristics Moderate   3-4 documented comorbidities or personal factors 4 or more Unstable and unpredictable High              Saadia Morocho, PT, DPT  10/14/2020  2:09 PM

## 2021-07-03 NOTE — ADDENDUM NOTE
Addendum Note by Helene Marcos CMA at 7/8/2020 11:57 AM     Author: Helene Marcos CMA Service: -- Author Type: Certified Medical Assistant    Filed: 7/8/2020 11:57 AM Encounter Date: 7/7/2020 Status: Signed    : Helene Marcos CMA (Certified Medical Assistant)    Addended by: HELENE MARCOS on: 7/8/2020 11:57 AM        Modules accepted: Orders

## 2021-09-19 ENCOUNTER — HEALTH MAINTENANCE LETTER (OUTPATIENT)
Age: 71
End: 2021-09-19

## 2022-01-19 DIAGNOSIS — Z78.9 STATIN INTOLERANCE: ICD-10-CM

## 2022-01-19 DIAGNOSIS — E78.5 HYPERLIPIDEMIA, UNSPECIFIED HYPERLIPIDEMIA TYPE: ICD-10-CM

## 2022-01-20 RX ORDER — EVOLOCUMAB 140 MG/ML
140 INJECTION, SOLUTION SUBCUTANEOUS
Qty: 6 ML | Refills: 3 | Status: ON HOLD | OUTPATIENT
Start: 2022-01-20 | End: 2023-09-11

## 2022-06-26 ENCOUNTER — HEALTH MAINTENANCE LETTER (OUTPATIENT)
Age: 72
End: 2022-06-26

## 2022-08-17 ENCOUNTER — TRANSFERRED RECORDS (OUTPATIENT)
Dept: HEALTH INFORMATION MANAGEMENT | Facility: CLINIC | Age: 72
End: 2022-08-17

## 2022-08-23 ENCOUNTER — TRANSFERRED RECORDS (OUTPATIENT)
Dept: HEALTH INFORMATION MANAGEMENT | Facility: CLINIC | Age: 72
End: 2022-08-23

## 2022-11-21 ENCOUNTER — HEALTH MAINTENANCE LETTER (OUTPATIENT)
Age: 72
End: 2022-11-21

## 2022-11-22 NOTE — TELEPHONE ENCOUNTER
----- Message from Venus Barrera PA-C sent at 11/24/2020 11:27 AM CST -----  Please call the patient let him know that I reviewed his x-ray.  This does not show any instability between flexion and extension.  Dr. Pierre can review results at his consultation.   Patient is currently smoking 1-2 cpd and using the patches with no negative side effects at this time. Patient has set quit date for 11/25/22.

## 2023-01-04 ENCOUNTER — TRANSFERRED RECORDS (OUTPATIENT)
Dept: HEALTH INFORMATION MANAGEMENT | Facility: CLINIC | Age: 73
End: 2023-01-04

## 2023-01-11 ENCOUNTER — TRANSFERRED RECORDS (OUTPATIENT)
Dept: HEALTH INFORMATION MANAGEMENT | Facility: CLINIC | Age: 73
End: 2023-01-11

## 2023-02-16 ENCOUNTER — TRANSFERRED RECORDS (OUTPATIENT)
Dept: HEALTH INFORMATION MANAGEMENT | Facility: CLINIC | Age: 73
End: 2023-02-16
Payer: COMMERCIAL

## 2023-02-20 ENCOUNTER — TRANSCRIBE ORDERS (OUTPATIENT)
Dept: OTHER | Age: 73
End: 2023-02-20

## 2023-02-20 DIAGNOSIS — H49.22 SIXTH NERVE PALSY OF LEFT EYE: Primary | ICD-10-CM

## 2023-05-30 NOTE — TELEPHONE ENCOUNTER
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

## 2023-06-05 ENCOUNTER — TRANSFERRED RECORDS (OUTPATIENT)
Dept: HEALTH INFORMATION MANAGEMENT | Facility: CLINIC | Age: 73
End: 2023-06-05
Payer: COMMERCIAL

## 2023-06-28 ENCOUNTER — OFFICE VISIT (OUTPATIENT)
Dept: OPHTHALMOLOGY | Facility: CLINIC | Age: 73
End: 2023-06-28
Payer: COMMERCIAL

## 2023-06-28 ENCOUNTER — PRE VISIT (OUTPATIENT)
Dept: OPHTHALMOLOGY | Facility: CLINIC | Age: 73
End: 2023-06-28

## 2023-06-28 DIAGNOSIS — H53.10 SUBJECTIVE VISUAL DISTURBANCE: ICD-10-CM

## 2023-06-28 DIAGNOSIS — H51.8 DIVERGENCE INSUFFICIENCY: Primary | ICD-10-CM

## 2023-06-28 DIAGNOSIS — H53.10 SUBJECTIVE VISUAL DISTURBANCE: Primary | ICD-10-CM

## 2023-06-28 PROCEDURE — 99204 OFFICE O/P NEW MOD 45 MIN: CPT | Mod: GC | Performed by: OPHTHALMOLOGY

## 2023-06-28 PROCEDURE — 92060 SENSORIMOTOR EXAMINATION: CPT | Mod: GC | Performed by: OPHTHALMOLOGY

## 2023-06-28 ASSESSMENT — CONF VISUAL FIELD
OS_NORMAL: 1
OS_INFERIOR_TEMPORAL_RESTRICTION: 0
OS_INFERIOR_NASAL_RESTRICTION: 0
METHOD: COUNTING FINGERS
OD_SUPERIOR_TEMPORAL_RESTRICTION: 0
OD_INFERIOR_TEMPORAL_RESTRICTION: 0
OS_SUPERIOR_TEMPORAL_RESTRICTION: 0
OD_INFERIOR_NASAL_RESTRICTION: 0
OS_SUPERIOR_NASAL_RESTRICTION: 0
OD_SUPERIOR_NASAL_RESTRICTION: 0
OD_NORMAL: 1

## 2023-06-28 ASSESSMENT — SLIT LAMP EXAM - LIDS
COMMENTS: NORMAL
COMMENTS: NORMAL

## 2023-06-28 ASSESSMENT — VISUAL ACUITY
OD_SC: 20/20
METHOD: SNELLEN - LINEAR
OS_SC+: -2
OS_SC: 20/25

## 2023-06-28 ASSESSMENT — CUP TO DISC RATIO
OS_RATIO: 0.25
OD_RATIO: 0.25

## 2023-06-28 ASSESSMENT — TONOMETRY
IOP_METHOD: ICARE
OS_IOP_MMHG: 16
OD_IOP_MMHG: 13

## 2023-06-28 NOTE — NURSING NOTE
Chief Complaints and History of Present Illnesses   Patient presents with     Annual Eye Exam     Chief Complaint(s) and History of Present Illness(es)     Annual Eye Exam            Laterality: both eyes    Associated symptoms: Negative for eye pain, redness, flashes and floaters    Treatments tried: no treatments    Pain scale: 0/10          Comments    Patient states after cataract sx 5 months ago he has a lot of trouble focusing. Patient states a lack of sharp images.   MARIA TERESA Mullen June 28, 2023 8:17 AM
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
## 2023-07-09 ENCOUNTER — HEALTH MAINTENANCE LETTER (OUTPATIENT)
Age: 73
End: 2023-07-09

## 2023-09-09 ENCOUNTER — APPOINTMENT (OUTPATIENT)
Dept: GENERAL RADIOLOGY | Facility: CLINIC | Age: 73
DRG: 200 | End: 2023-09-09
Attending: EMERGENCY MEDICINE
Payer: COMMERCIAL

## 2023-09-09 ENCOUNTER — APPOINTMENT (OUTPATIENT)
Dept: CT IMAGING | Facility: CLINIC | Age: 73
DRG: 200 | End: 2023-09-09
Attending: EMERGENCY MEDICINE
Payer: COMMERCIAL

## 2023-09-09 ENCOUNTER — HOSPITAL ENCOUNTER (INPATIENT)
Facility: CLINIC | Age: 73
LOS: 3 days | Discharge: HOME | DRG: 200 | End: 2023-09-12
Attending: EMERGENCY MEDICINE | Admitting: HOSPITALIST
Payer: COMMERCIAL

## 2023-09-09 DIAGNOSIS — J94.2 HEMOPNEUMOTHORAX ON RIGHT: ICD-10-CM

## 2023-09-09 DIAGNOSIS — S22.41XA CLOSED FRACTURE OF MULTIPLE RIBS OF RIGHT SIDE, INITIAL ENCOUNTER: ICD-10-CM

## 2023-09-09 DIAGNOSIS — R91.8 PULMONARY NODULES: ICD-10-CM

## 2023-09-09 DIAGNOSIS — S42.101A CLOSED FRACTURE OF RIGHT SCAPULA, UNSPECIFIED PART OF SCAPULA, INITIAL ENCOUNTER: ICD-10-CM

## 2023-09-09 DIAGNOSIS — T14.8XXA SKIN ABRASION: ICD-10-CM

## 2023-09-09 PROBLEM — R00.1 SINUS BRADYCARDIA: Status: ACTIVE | Noted: 2023-09-09

## 2023-09-09 LAB
ALBUMIN SERPL BCG-MCNC: 4.4 G/DL (ref 3.5–5.2)
ALBUMIN UR-MCNC: NEGATIVE MG/DL
ALP SERPL-CCNC: 83 U/L (ref 40–129)
ALT SERPL W P-5'-P-CCNC: 27 U/L (ref 0–70)
ANION GAP SERPL CALCULATED.3IONS-SCNC: 12 MMOL/L (ref 7–15)
APPEARANCE UR: CLEAR
AST SERPL W P-5'-P-CCNC: 27 U/L (ref 0–45)
ATRIAL RATE - MUSE: 60 BPM
BASOPHILS # BLD AUTO: 0 10E3/UL (ref 0–0.2)
BASOPHILS NFR BLD AUTO: 0 %
BILIRUB SERPL-MCNC: 0.5 MG/DL
BILIRUB UR QL STRIP: NEGATIVE
BUN SERPL-MCNC: 22.9 MG/DL (ref 8–23)
CALCIUM SERPL-MCNC: 9.5 MG/DL (ref 8.8–10.2)
CHLORIDE SERPL-SCNC: 101 MMOL/L (ref 98–107)
COLOR UR AUTO: NORMAL
CREAT BLD-MCNC: 1.1 MG/DL (ref 0.7–1.3)
CREAT SERPL-MCNC: 1.08 MG/DL (ref 0.67–1.17)
DEPRECATED HCO3 PLAS-SCNC: 27 MMOL/L (ref 22–29)
DIASTOLIC BLOOD PRESSURE - MUSE: NORMAL MMHG
EGFRCR SERPLBLD CKD-EPI 2021: 72 ML/MIN/1.73M2
EGFRCR SERPLBLD CKD-EPI 2021: >60 ML/MIN/1.73M2
EOSINOPHIL # BLD AUTO: 0.1 10E3/UL (ref 0–0.7)
EOSINOPHIL NFR BLD AUTO: 1 %
ERYTHROCYTE [DISTWIDTH] IN BLOOD BY AUTOMATED COUNT: 13 % (ref 10–15)
GLUCOSE SERPL-MCNC: 129 MG/DL (ref 70–99)
GLUCOSE UR STRIP-MCNC: NEGATIVE MG/DL
HCT VFR BLD AUTO: 42.2 % (ref 40–53)
HGB BLD-MCNC: 14.1 G/DL (ref 13.3–17.7)
HGB UR QL STRIP: NEGATIVE
HYALINE CASTS: 2 /LPF
IMM GRANULOCYTES # BLD: 0.2 10E3/UL
IMM GRANULOCYTES NFR BLD: 1 %
INTERPRETATION ECG - MUSE: NORMAL
KETONES UR STRIP-MCNC: NEGATIVE MG/DL
LEUKOCYTE ESTERASE UR QL STRIP: NEGATIVE
LYMPHOCYTES # BLD AUTO: 1.3 10E3/UL (ref 0.8–5.3)
LYMPHOCYTES NFR BLD AUTO: 9 %
MCH RBC QN AUTO: 30.3 PG (ref 26.5–33)
MCHC RBC AUTO-ENTMCNC: 33.4 G/DL (ref 31.5–36.5)
MCV RBC AUTO: 91 FL (ref 78–100)
MONOCYTES # BLD AUTO: 0.9 10E3/UL (ref 0–1.3)
MONOCYTES NFR BLD AUTO: 6 %
NEUTROPHILS # BLD AUTO: 12.1 10E3/UL (ref 1.6–8.3)
NEUTROPHILS NFR BLD AUTO: 83 %
NITRATE UR QL: NEGATIVE
NRBC # BLD AUTO: 0 10E3/UL
NRBC BLD AUTO-RTO: 0 /100
P AXIS - MUSE: 62 DEGREES
PH UR STRIP: 6 [PH] (ref 5–7)
PLATELET # BLD AUTO: 255 10E3/UL (ref 150–450)
POTASSIUM SERPL-SCNC: 3.6 MMOL/L (ref 3.4–5.3)
PR INTERVAL - MUSE: 224 MS
PROT SERPL-MCNC: 7.3 G/DL (ref 6.4–8.3)
QRS DURATION - MUSE: 114 MS
QT - MUSE: 436 MS
QTC - MUSE: 436 MS
R AXIS - MUSE: 9 DEGREES
RBC # BLD AUTO: 4.65 10E6/UL (ref 4.4–5.9)
RBC URINE: 1 /HPF
SODIUM SERPL-SCNC: 140 MMOL/L (ref 136–145)
SP GR UR STRIP: 1.01 (ref 1–1.03)
SYSTOLIC BLOOD PRESSURE - MUSE: NORMAL MMHG
T AXIS - MUSE: 118 DEGREES
UROBILINOGEN UR STRIP-MCNC: NORMAL MG/DL
VENTRICULAR RATE- MUSE: 60 BPM
WBC # BLD AUTO: 14.6 10E3/UL (ref 4–11)
WBC URINE: 3 /HPF

## 2023-09-09 PROCEDURE — 93005 ELECTROCARDIOGRAM TRACING: CPT

## 2023-09-09 PROCEDURE — 74177 CT ABD & PELVIS W/CONTRAST: CPT

## 2023-09-09 PROCEDURE — 250N000011 HC RX IP 250 OP 636: Performed by: EMERGENCY MEDICINE

## 2023-09-09 PROCEDURE — 85025 COMPLETE CBC W/AUTO DIFF WBC: CPT | Performed by: EMERGENCY MEDICINE

## 2023-09-09 PROCEDURE — 250N000013 HC RX MED GY IP 250 OP 250 PS 637: Performed by: HOSPITALIST

## 2023-09-09 PROCEDURE — 250N000009 HC RX 250: Performed by: EMERGENCY MEDICINE

## 2023-09-09 PROCEDURE — 250N000011 HC RX IP 250 OP 636

## 2023-09-09 PROCEDURE — 250N000011 HC RX IP 250 OP 636: Mod: JZ | Performed by: HOSPITALIST

## 2023-09-09 PROCEDURE — 99223 1ST HOSP IP/OBS HIGH 75: CPT | Mod: AI | Performed by: HOSPITALIST

## 2023-09-09 PROCEDURE — 73030 X-RAY EXAM OF SHOULDER: CPT | Mod: RT

## 2023-09-09 PROCEDURE — 250N000013 HC RX MED GY IP 250 OP 250 PS 637: Performed by: EMERGENCY MEDICINE

## 2023-09-09 PROCEDURE — 120N000013 HC R&B IMCU

## 2023-09-09 PROCEDURE — 81001 URINALYSIS AUTO W/SCOPE: CPT | Performed by: EMERGENCY MEDICINE

## 2023-09-09 PROCEDURE — 96374 THER/PROPH/DIAG INJ IV PUSH: CPT | Mod: 59

## 2023-09-09 PROCEDURE — 99233 SBSQ HOSP IP/OBS HIGH 50: CPT | Performed by: SURGERY

## 2023-09-09 PROCEDURE — 99285 EMERGENCY DEPT VISIT HI MDM: CPT | Mod: 25

## 2023-09-09 PROCEDURE — 82565 ASSAY OF CREATININE: CPT

## 2023-09-09 PROCEDURE — 80053 COMPREHEN METABOLIC PANEL: CPT | Performed by: EMERGENCY MEDICINE

## 2023-09-09 PROCEDURE — 96375 TX/PRO/DX INJ NEW DRUG ADDON: CPT

## 2023-09-09 PROCEDURE — 36415 COLL VENOUS BLD VENIPUNCTURE: CPT | Performed by: EMERGENCY MEDICINE

## 2023-09-09 PROCEDURE — 70450 CT HEAD/BRAIN W/O DYE: CPT

## 2023-09-09 RX ORDER — ONDANSETRON 4 MG/1
4 TABLET, ORALLY DISINTEGRATING ORAL EVERY 6 HOURS PRN
Status: DISCONTINUED | OUTPATIENT
Start: 2023-09-09 | End: 2023-09-12 | Stop reason: HOSPADM

## 2023-09-09 RX ORDER — NITROGLYCERIN 0.4 MG/1
0.4 TABLET SUBLINGUAL EVERY 5 MIN PRN
Status: DISCONTINUED | OUTPATIENT
Start: 2023-09-09 | End: 2023-09-12 | Stop reason: HOSPADM

## 2023-09-09 RX ORDER — LIDOCAINE 40 MG/G
CREAM TOPICAL
Status: DISCONTINUED | OUTPATIENT
Start: 2023-09-09 | End: 2023-09-12 | Stop reason: HOSPADM

## 2023-09-09 RX ORDER — NIFEDIPINE 90 MG/1
90 TABLET, EXTENDED RELEASE ORAL DAILY
COMMUNITY

## 2023-09-09 RX ORDER — TRAMADOL HYDROCHLORIDE 50 MG/1
50 TABLET ORAL EVERY 6 HOURS PRN
Status: DISCONTINUED | OUTPATIENT
Start: 2023-09-09 | End: 2023-09-09

## 2023-09-09 RX ORDER — HYDROCHLOROTHIAZIDE 25 MG/1
25 TABLET ORAL DAILY
Status: DISCONTINUED | OUTPATIENT
Start: 2023-09-10 | End: 2023-09-12 | Stop reason: HOSPADM

## 2023-09-09 RX ORDER — ONDANSETRON 2 MG/ML
4 INJECTION INTRAMUSCULAR; INTRAVENOUS EVERY 6 HOURS PRN
Status: DISCONTINUED | OUTPATIENT
Start: 2023-09-09 | End: 2023-09-12 | Stop reason: HOSPADM

## 2023-09-09 RX ORDER — LIDOCAINE 40 MG/G
CREAM TOPICAL
Status: DISCONTINUED | OUTPATIENT
Start: 2023-09-09 | End: 2023-09-09

## 2023-09-09 RX ORDER — HYDROCHLOROTHIAZIDE 12.5 MG/1
12.5 CAPSULE ORAL DAILY
Status: DISCONTINUED | OUTPATIENT
Start: 2023-09-09 | End: 2023-09-09

## 2023-09-09 RX ORDER — NALOXONE HYDROCHLORIDE 0.4 MG/ML
0.2 INJECTION, SOLUTION INTRAMUSCULAR; INTRAVENOUS; SUBCUTANEOUS
Status: DISCONTINUED | OUTPATIENT
Start: 2023-09-09 | End: 2023-09-12 | Stop reason: HOSPADM

## 2023-09-09 RX ORDER — AMOXICILLIN 250 MG
2 CAPSULE ORAL 2 TIMES DAILY
Status: DISCONTINUED | OUTPATIENT
Start: 2023-09-09 | End: 2023-09-12 | Stop reason: HOSPADM

## 2023-09-09 RX ORDER — IOPAMIDOL 755 MG/ML
109 INJECTION, SOLUTION INTRAVASCULAR ONCE
Status: COMPLETED | OUTPATIENT
Start: 2023-09-09 | End: 2023-09-09

## 2023-09-09 RX ORDER — IBUPROFEN 600 MG/1
600 TABLET, FILM COATED ORAL ONCE
Status: COMPLETED | OUTPATIENT
Start: 2023-09-09 | End: 2023-09-09

## 2023-09-09 RX ORDER — FENTANYL CITRATE 50 UG/ML
INJECTION, SOLUTION INTRAMUSCULAR; INTRAVENOUS
Status: COMPLETED
Start: 2023-09-09 | End: 2023-09-09

## 2023-09-09 RX ORDER — PROCHLORPERAZINE 25 MG
12.5 SUPPOSITORY, RECTAL RECTAL EVERY 12 HOURS PRN
Status: DISCONTINUED | OUTPATIENT
Start: 2023-09-09 | End: 2023-09-12 | Stop reason: HOSPADM

## 2023-09-09 RX ORDER — NALOXONE HYDROCHLORIDE 0.4 MG/ML
0.4 INJECTION, SOLUTION INTRAMUSCULAR; INTRAVENOUS; SUBCUTANEOUS
Status: DISCONTINUED | OUTPATIENT
Start: 2023-09-09 | End: 2023-09-12 | Stop reason: HOSPADM

## 2023-09-09 RX ORDER — PROCHLORPERAZINE MALEATE 5 MG
5 TABLET ORAL EVERY 6 HOURS PRN
Status: DISCONTINUED | OUTPATIENT
Start: 2023-09-09 | End: 2023-09-12 | Stop reason: HOSPADM

## 2023-09-09 RX ORDER — LOSARTAN POTASSIUM 100 MG/1
100 TABLET ORAL DAILY
COMMUNITY

## 2023-09-09 RX ORDER — AMOXICILLIN 250 MG
1 CAPSULE ORAL 2 TIMES DAILY
Status: DISCONTINUED | OUTPATIENT
Start: 2023-09-09 | End: 2023-09-12 | Stop reason: HOSPADM

## 2023-09-09 RX ORDER — ACETAMINOPHEN 325 MG/1
975 TABLET ORAL EVERY 8 HOURS
Status: DISCONTINUED | OUTPATIENT
Start: 2023-09-09 | End: 2023-09-12 | Stop reason: HOSPADM

## 2023-09-09 RX ORDER — LOSARTAN POTASSIUM 100 MG/1
100 TABLET ORAL DAILY
Status: DISCONTINUED | OUTPATIENT
Start: 2023-09-10 | End: 2023-09-12 | Stop reason: HOSPADM

## 2023-09-09 RX ORDER — PRAVASTATIN SODIUM 80 MG/1
80 TABLET ORAL EVERY EVENING
COMMUNITY

## 2023-09-09 RX ORDER — FENTANYL CITRATE 50 UG/ML
50 INJECTION, SOLUTION INTRAMUSCULAR; INTRAVENOUS ONCE
Status: COMPLETED | OUTPATIENT
Start: 2023-09-09 | End: 2023-09-09

## 2023-09-09 RX ORDER — ACETAMINOPHEN 500 MG
1000 TABLET ORAL ONCE
Status: COMPLETED | OUTPATIENT
Start: 2023-09-09 | End: 2023-09-09

## 2023-09-09 RX ORDER — HYDROMORPHONE HCL IN WATER/PF 6 MG/30 ML
0.2 PATIENT CONTROLLED ANALGESIA SYRINGE INTRAVENOUS
Status: DISCONTINUED | OUTPATIENT
Start: 2023-09-09 | End: 2023-09-12 | Stop reason: HOSPADM

## 2023-09-09 RX ORDER — VALSARTAN 160 MG/1
160 TABLET ORAL DAILY
Status: DISCONTINUED | OUTPATIENT
Start: 2023-09-09 | End: 2023-09-09

## 2023-09-09 RX ORDER — FAMOTIDINE 20 MG/1
20 TABLET, FILM COATED ORAL 2 TIMES DAILY
Status: DISCONTINUED | OUTPATIENT
Start: 2023-09-09 | End: 2023-09-12 | Stop reason: HOSPADM

## 2023-09-09 RX ORDER — CEFTRIAXONE 2 G/1
2 INJECTION, POWDER, FOR SOLUTION INTRAMUSCULAR; INTRAVENOUS EVERY 24 HOURS
Status: DISCONTINUED | OUTPATIENT
Start: 2023-09-09 | End: 2023-09-10

## 2023-09-09 RX ORDER — AMLODIPINE BESYLATE 10 MG/1
10 TABLET ORAL DAILY
Status: DISCONTINUED | OUTPATIENT
Start: 2023-09-09 | End: 2023-09-09

## 2023-09-09 RX ADMIN — SODIUM CHLORIDE 73 ML: 9 INJECTION, SOLUTION INTRAVENOUS at 14:09

## 2023-09-09 RX ADMIN — CEFTRIAXONE SODIUM 2 G: 2 INJECTION, POWDER, FOR SOLUTION INTRAMUSCULAR; INTRAVENOUS at 16:29

## 2023-09-09 RX ADMIN — HYDROMORPHONE HYDROCHLORIDE 1 MG: 2 TABLET ORAL at 22:17

## 2023-09-09 RX ADMIN — FAMOTIDINE 20 MG: 20 TABLET ORAL at 20:58

## 2023-09-09 RX ADMIN — FENTANYL CITRATE 50 MCG: 50 INJECTION, SOLUTION INTRAMUSCULAR; INTRAVENOUS at 14:00

## 2023-09-09 RX ADMIN — ACETAMINOPHEN 1000 MG: 500 TABLET, FILM COATED ORAL at 12:59

## 2023-09-09 RX ADMIN — IBUPROFEN 600 MG: 600 TABLET ORAL at 12:59

## 2023-09-09 RX ADMIN — ACETAMINOPHEN 975 MG: 325 TABLET, FILM COATED ORAL at 21:00

## 2023-09-09 RX ADMIN — IOPAMIDOL 109 ML: 755 INJECTION, SOLUTION INTRAVENOUS at 14:09

## 2023-09-09 RX ADMIN — SENNOSIDES AND DOCUSATE SODIUM 1 TABLET: 50; 8.6 TABLET ORAL at 20:58

## 2023-09-09 ASSESSMENT — ACTIVITIES OF DAILY LIVING (ADL)
ADLS_ACUITY_SCORE: 35
ADLS_ACUITY_SCORE: 36
ADLS_ACUITY_SCORE: 35
ADLS_ACUITY_SCORE: 36
ADLS_ACUITY_SCORE: 35
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
## 2023-09-09 NOTE — PHARMACY-ADMISSION MEDICATION HISTORY
Pharmacist Admission Medication History    Admission medication history is complete. The information provided in this note is only as accurate as the sources available at the time of the update.    Medication reconciliation/reorder completed by provider prior to medication history? No    Information Source(s): Patient, Prescription bottles, and CareEverywhere/SureScripts via in-person    Pertinent Information: -He reports hydrochlorothiazide increased from 12.5mg to 25mg daily    Changes made to PTA medication list:  Added: losartan, pravastatin  Deleted: amlodipine, vit C, Vit D, fish oil, probiotic, MVI, tramadol, coenzyme Q10, zinc, valsartan  Changed: nifedipine from 30mg to 90mg    Allergies reviewed with patient and updates made in EHR: yes    Medication History Completed By: Adalgisa Pride RPH 9/9/2023 5:43 PM    Prior to Admission medications    Medication Sig Last Dose Taking? Auth Provider Long Term End Date   hydroCHLOROthiazide (MICROZIDE) 12.5 mg capsule [HYDROCHLOROTHIAZIDE (MICROZIDE) 12.5 MG CAPSULE] TAKE 1 CAPSULE BY MOUTH EVERY DAY  Patient taking differently: Take 25 mg by mouth every morning 9/9/2023 at am Yes Kristen Boyce MD Yes    losartan (COZAAR) 100 MG tablet Take 100 mg by mouth daily 9/9/2023 at am Yes Unknown, Entered By History Yes    NIFEdipine ER OSMOTIC (PROCARDIA XL) 90 MG 24 hr tablet Take 90 mg by mouth daily 9/9/2023 at am Yes Unknown, Entered By History No    pravastatin (PRAVACHOL) 80 MG tablet Take 80 mg by mouth every evening 9/8/2023 at pm Yes Unknown, Entered By History Yes    aspirin (ASPIRIN LOW DOSE) 81 MG EC tablet [ASPIRIN (ASPIRIN LOW DOSE) 81 MG EC TABLET] Take 1 tablet by mouth daily.  Patient not taking: Reported on 9/9/2023 Not Taking at ran out  Provider, Historical Yes    evolocumab (REPATHA SURECLICK) 140 MG/ML prefilled autoinjector Inject 1 mL (140 mg) Subcutaneous every 14 days  Patient not taking: Reported on 9/9/2023 Not Taking  Daniel Mercado  MD Yes
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
## 2023-09-10 ENCOUNTER — APPOINTMENT (OUTPATIENT)
Dept: GENERAL RADIOLOGY | Facility: CLINIC | Age: 73
DRG: 200 | End: 2023-09-10
Attending: HOSPITALIST
Payer: COMMERCIAL

## 2023-09-10 LAB
ALBUMIN SERPL BCG-MCNC: 3.7 G/DL (ref 3.5–5.2)
ALP SERPL-CCNC: 69 U/L (ref 40–129)
ALT SERPL W P-5'-P-CCNC: 20 U/L (ref 0–70)
ANION GAP SERPL CALCULATED.3IONS-SCNC: 11 MMOL/L (ref 7–15)
AST SERPL W P-5'-P-CCNC: 26 U/L (ref 0–45)
BASOPHILS # BLD AUTO: 0 10E3/UL (ref 0–0.2)
BASOPHILS NFR BLD AUTO: 0 %
BILIRUB SERPL-MCNC: 0.6 MG/DL
BUN SERPL-MCNC: 25.2 MG/DL (ref 8–23)
CALCIUM SERPL-MCNC: 9.1 MG/DL (ref 8.8–10.2)
CHLORIDE SERPL-SCNC: 103 MMOL/L (ref 98–107)
CREAT SERPL-MCNC: 1 MG/DL (ref 0.67–1.17)
DEPRECATED HCO3 PLAS-SCNC: 26 MMOL/L (ref 22–29)
EGFRCR SERPLBLD CKD-EPI 2021: 79 ML/MIN/1.73M2
EOSINOPHIL # BLD AUTO: 0.1 10E3/UL (ref 0–0.7)
EOSINOPHIL NFR BLD AUTO: 1 %
ERYTHROCYTE [DISTWIDTH] IN BLOOD BY AUTOMATED COUNT: 13 % (ref 10–15)
GLUCOSE SERPL-MCNC: 104 MG/DL (ref 70–99)
HCT VFR BLD AUTO: 39.9 % (ref 40–53)
HGB BLD-MCNC: 13.5 G/DL (ref 13.3–17.7)
IMM GRANULOCYTES # BLD: 0.1 10E3/UL
IMM GRANULOCYTES NFR BLD: 1 %
INR PPP: 1.03 (ref 0.85–1.15)
LYMPHOCYTES # BLD AUTO: 1.2 10E3/UL (ref 0.8–5.3)
LYMPHOCYTES NFR BLD AUTO: 12 %
MCH RBC QN AUTO: 30.5 PG (ref 26.5–33)
MCHC RBC AUTO-ENTMCNC: 33.8 G/DL (ref 31.5–36.5)
MCV RBC AUTO: 90 FL (ref 78–100)
MONOCYTES # BLD AUTO: 1.2 10E3/UL (ref 0–1.3)
MONOCYTES NFR BLD AUTO: 12 %
NEUTROPHILS # BLD AUTO: 7.3 10E3/UL (ref 1.6–8.3)
NEUTROPHILS NFR BLD AUTO: 74 %
NRBC # BLD AUTO: 0 10E3/UL
NRBC BLD AUTO-RTO: 0 /100
PLATELET # BLD AUTO: 233 10E3/UL (ref 150–450)
POTASSIUM SERPL-SCNC: 3.4 MMOL/L (ref 3.4–5.3)
PROT SERPL-MCNC: 6.6 G/DL (ref 6.4–8.3)
RBC # BLD AUTO: 4.42 10E6/UL (ref 4.4–5.9)
SODIUM SERPL-SCNC: 140 MMOL/L (ref 136–145)
WBC # BLD AUTO: 9.8 10E3/UL (ref 4–11)

## 2023-09-10 PROCEDURE — 85610 PROTHROMBIN TIME: CPT | Performed by: HOSPITALIST

## 2023-09-10 PROCEDURE — 36415 COLL VENOUS BLD VENIPUNCTURE: CPT | Performed by: HOSPITALIST

## 2023-09-10 PROCEDURE — 80053 COMPREHEN METABOLIC PANEL: CPT | Performed by: HOSPITALIST

## 2023-09-10 PROCEDURE — 99232 SBSQ HOSP IP/OBS MODERATE 35: CPT | Performed by: INTERNAL MEDICINE

## 2023-09-10 PROCEDURE — 85025 COMPLETE CBC W/AUTO DIFF WBC: CPT | Performed by: HOSPITALIST

## 2023-09-10 PROCEDURE — 71045 X-RAY EXAM CHEST 1 VIEW: CPT

## 2023-09-10 PROCEDURE — 250N000013 HC RX MED GY IP 250 OP 250 PS 637: Performed by: INTERNAL MEDICINE

## 2023-09-10 PROCEDURE — 250N000013 HC RX MED GY IP 250 OP 250 PS 637: Performed by: HOSPITALIST

## 2023-09-10 PROCEDURE — 120N000013 HC R&B IMCU

## 2023-09-10 PROCEDURE — 250N000009 HC RX 250: Performed by: INTERNAL MEDICINE

## 2023-09-10 RX ORDER — PRAVASTATIN SODIUM 40 MG
80 TABLET ORAL EVERY EVENING
Status: DISCONTINUED | OUTPATIENT
Start: 2023-09-10 | End: 2023-09-12 | Stop reason: HOSPADM

## 2023-09-10 RX ORDER — GINSENG 100 MG
CAPSULE ORAL 2 TIMES DAILY
Status: DISCONTINUED | OUTPATIENT
Start: 2023-09-10 | End: 2023-09-12 | Stop reason: HOSPADM

## 2023-09-10 RX ORDER — LIDOCAINE 4 G/G
1 PATCH TOPICAL
Status: DISCONTINUED | OUTPATIENT
Start: 2023-09-10 | End: 2023-09-12 | Stop reason: HOSPADM

## 2023-09-10 RX ORDER — NIFEDIPINE 90 MG/1
90 TABLET, EXTENDED RELEASE ORAL DAILY
Status: DISCONTINUED | OUTPATIENT
Start: 2023-09-10 | End: 2023-09-12 | Stop reason: HOSPADM

## 2023-09-10 RX ADMIN — HYDROMORPHONE HYDROCHLORIDE 1 MG: 2 TABLET ORAL at 07:41

## 2023-09-10 RX ADMIN — PRAVASTATIN SODIUM 80 MG: 40 TABLET ORAL at 20:25

## 2023-09-10 RX ADMIN — ACETAMINOPHEN 975 MG: 325 TABLET, FILM COATED ORAL at 14:10

## 2023-09-10 RX ADMIN — FAMOTIDINE 20 MG: 20 TABLET ORAL at 09:12

## 2023-09-10 RX ADMIN — NIFEDIPINE 90 MG: 90 TABLET, FILM COATED, EXTENDED RELEASE ORAL at 10:38

## 2023-09-10 RX ADMIN — HYDROMORPHONE HYDROCHLORIDE 1 MG: 2 TABLET ORAL at 14:10

## 2023-09-10 RX ADMIN — LIDOCAINE 1 PATCH: 4 PATCH TOPICAL at 20:35

## 2023-09-10 RX ADMIN — BACITRACIN: 500 OINTMENT TOPICAL at 20:47

## 2023-09-10 RX ADMIN — HYDROCHLOROTHIAZIDE 25 MG: 25 TABLET ORAL at 09:11

## 2023-09-10 RX ADMIN — ACETAMINOPHEN 975 MG: 325 TABLET, FILM COATED ORAL at 21:28

## 2023-09-10 RX ADMIN — HYDROMORPHONE HYDROCHLORIDE 1 MG: 2 TABLET ORAL at 23:36

## 2023-09-10 RX ADMIN — SENNOSIDES AND DOCUSATE SODIUM 1 TABLET: 50; 8.6 TABLET ORAL at 20:25

## 2023-09-10 RX ADMIN — ACETAMINOPHEN 975 MG: 325 TABLET, FILM COATED ORAL at 06:36

## 2023-09-10 RX ADMIN — SENNOSIDES AND DOCUSATE SODIUM 1 TABLET: 50; 8.6 TABLET ORAL at 09:10

## 2023-09-10 RX ADMIN — LOSARTAN POTASSIUM 100 MG: 100 TABLET, FILM COATED ORAL at 09:10

## 2023-09-10 RX ADMIN — HYDROMORPHONE HYDROCHLORIDE 1 MG: 2 TABLET ORAL at 19:06

## 2023-09-10 RX ADMIN — Medication 1 MG: at 21:29

## 2023-09-10 RX ADMIN — FAMOTIDINE 20 MG: 20 TABLET ORAL at 20:25

## 2023-09-10 ASSESSMENT — ACTIVITIES OF DAILY LIVING (ADL)
ADLS_ACUITY_SCORE: 32
ADLS_ACUITY_SCORE: 32
ADLS_ACUITY_SCORE: 36
ADLS_ACUITY_SCORE: 32
ADLS_ACUITY_SCORE: 34
ADLS_ACUITY_SCORE: 32
ADLS_ACUITY_SCORE: 34
ADLS_ACUITY_SCORE: 34
ADLS_ACUITY_SCORE: 32
ADLS_ACUITY_SCORE: 32

## 2023-09-10 NOTE — PLAN OF CARE
Goal Outcome Evaluation:       A&O x4 VSS on RA. Lungs sounds clear. Bowel Sounds - normoactive. Urine Output - adequate. Wounds - Abrasions on rt knee and rt elbow. Ambulation - SBA. Diet - regular Pain controlled by - denied pain at rest.  Has PRN's when needed.
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
## 2023-09-10 NOTE — PLAN OF CARE
Goal Outcome Evaluation:      Plan of Care Reviewed With: patient    Overall Patient Progress: improvingOverall Patient Progress: improving     Orientations: A&Ox4  Vitals/Pain: VSS on RA. Pt complains of mild pain relieved w/ scheduled tylenol and PRN PO dilaudid  Tele: SR w/1*AVB and BBB  Lines/Drains: 1 PIV  Skin/Wounds: Abrasions on R Knee and R Elbow CDI   GI/: Voiding, no BM on shift. Regular diet  Labs: Abnormal/Trends, Electrolyte Replacement- K 3.4, WBC 9.8  Ambulation/Assist: SBA  Sleep Quality: Fair  Plan: Echo procedure [place]. Surgical follow up. Continue to work on pulmonary toilet and pain management.

## 2023-09-11 ENCOUNTER — APPOINTMENT (OUTPATIENT)
Dept: CARDIOLOGY | Facility: CLINIC | Age: 73
DRG: 200 | End: 2023-09-11
Attending: HOSPITALIST
Payer: COMMERCIAL

## 2023-09-11 ENCOUNTER — APPOINTMENT (OUTPATIENT)
Dept: GENERAL RADIOLOGY | Facility: CLINIC | Age: 73
DRG: 200 | End: 2023-09-11
Attending: HOSPITALIST
Payer: COMMERCIAL

## 2023-09-11 LAB
ANION GAP SERPL CALCULATED.3IONS-SCNC: 11 MMOL/L (ref 7–15)
BASOPHILS # BLD AUTO: 0 10E3/UL (ref 0–0.2)
BASOPHILS NFR BLD AUTO: 0 %
BUN SERPL-MCNC: 21.1 MG/DL (ref 8–23)
CALCIUM SERPL-MCNC: 9 MG/DL (ref 8.8–10.2)
CHLORIDE SERPL-SCNC: 101 MMOL/L (ref 98–107)
CREAT SERPL-MCNC: 0.88 MG/DL (ref 0.67–1.17)
DEPRECATED HCO3 PLAS-SCNC: 27 MMOL/L (ref 22–29)
EGFRCR SERPLBLD CKD-EPI 2021: 90 ML/MIN/1.73M2
EOSINOPHIL # BLD AUTO: 0.1 10E3/UL (ref 0–0.7)
EOSINOPHIL NFR BLD AUTO: 1 %
ERYTHROCYTE [DISTWIDTH] IN BLOOD BY AUTOMATED COUNT: 13.1 % (ref 10–15)
GLUCOSE SERPL-MCNC: 111 MG/DL (ref 70–99)
HCT VFR BLD AUTO: 41.3 % (ref 40–53)
HGB BLD-MCNC: 13.8 G/DL (ref 13.3–17.7)
IMM GRANULOCYTES # BLD: 0.1 10E3/UL
IMM GRANULOCYTES NFR BLD: 1 %
LVEF ECHO: NORMAL
LYMPHOCYTES # BLD AUTO: 1.1 10E3/UL (ref 0.8–5.3)
LYMPHOCYTES NFR BLD AUTO: 11 %
MCH RBC QN AUTO: 30.1 PG (ref 26.5–33)
MCHC RBC AUTO-ENTMCNC: 33.4 G/DL (ref 31.5–36.5)
MCV RBC AUTO: 90 FL (ref 78–100)
MONOCYTES # BLD AUTO: 1.1 10E3/UL (ref 0–1.3)
MONOCYTES NFR BLD AUTO: 11 %
NEUTROPHILS # BLD AUTO: 7.6 10E3/UL (ref 1.6–8.3)
NEUTROPHILS NFR BLD AUTO: 76 %
NRBC # BLD AUTO: 0 10E3/UL
NRBC BLD AUTO-RTO: 0 /100
PLATELET # BLD AUTO: 209 10E3/UL (ref 150–450)
POTASSIUM SERPL-SCNC: 3.5 MMOL/L (ref 3.4–5.3)
RBC # BLD AUTO: 4.58 10E6/UL (ref 4.4–5.9)
SODIUM SERPL-SCNC: 139 MMOL/L (ref 136–145)
WBC # BLD AUTO: 10 10E3/UL (ref 4–11)

## 2023-09-11 PROCEDURE — 71045 X-RAY EXAM CHEST 1 VIEW: CPT

## 2023-09-11 PROCEDURE — 120N000013 HC R&B IMCU

## 2023-09-11 PROCEDURE — 85025 COMPLETE CBC W/AUTO DIFF WBC: CPT | Performed by: INTERNAL MEDICINE

## 2023-09-11 PROCEDURE — 250N000013 HC RX MED GY IP 250 OP 250 PS 637: Performed by: INTERNAL MEDICINE

## 2023-09-11 PROCEDURE — 80048 BASIC METABOLIC PNL TOTAL CA: CPT | Performed by: INTERNAL MEDICINE

## 2023-09-11 PROCEDURE — 93010 ELECTROCARDIOGRAM REPORT: CPT | Performed by: INTERNAL MEDICINE

## 2023-09-11 PROCEDURE — 999N000208 ECHOCARDIOGRAM COMPLETE

## 2023-09-11 PROCEDURE — 99231 SBSQ HOSP IP/OBS SF/LOW 25: CPT | Mod: FS | Performed by: SURGERY

## 2023-09-11 PROCEDURE — 250N000013 HC RX MED GY IP 250 OP 250 PS 637: Performed by: HOSPITALIST

## 2023-09-11 PROCEDURE — 255N000002 HC RX 255 OP 636: Performed by: HOSPITALIST

## 2023-09-11 PROCEDURE — 93306 TTE W/DOPPLER COMPLETE: CPT | Mod: 26 | Performed by: INTERNAL MEDICINE

## 2023-09-11 PROCEDURE — 99232 SBSQ HOSP IP/OBS MODERATE 35: CPT | Performed by: INTERNAL MEDICINE

## 2023-09-11 PROCEDURE — 93005 ELECTROCARDIOGRAM TRACING: CPT

## 2023-09-11 PROCEDURE — 36415 COLL VENOUS BLD VENIPUNCTURE: CPT | Performed by: INTERNAL MEDICINE

## 2023-09-11 RX ORDER — LIDOCAINE 4 G/G
1 PATCH TOPICAL EVERY 24 HOURS
Qty: 30 PATCH | Refills: 0 | Status: SHIPPED | OUTPATIENT
Start: 2023-09-11

## 2023-09-11 RX ORDER — ACETAMINOPHEN 325 MG/1
975 TABLET ORAL EVERY 8 HOURS
Qty: 180 TABLET | Refills: 0 | Status: SHIPPED | OUTPATIENT
Start: 2023-09-11

## 2023-09-11 RX ORDER — GINSENG 100 MG
CAPSULE ORAL 2 TIMES DAILY
Qty: 30 G | Refills: 1 | Status: SHIPPED | OUTPATIENT
Start: 2023-09-11

## 2023-09-11 RX ORDER — HYDROMORPHONE HYDROCHLORIDE 2 MG/1
1 TABLET ORAL EVERY 4 HOURS PRN
Qty: 15 TABLET | Refills: 0 | Status: SHIPPED | OUTPATIENT
Start: 2023-09-11

## 2023-09-11 RX ADMIN — ACETAMINOPHEN 975 MG: 325 TABLET, FILM COATED ORAL at 06:08

## 2023-09-11 RX ADMIN — FAMOTIDINE 20 MG: 20 TABLET ORAL at 08:29

## 2023-09-11 RX ADMIN — HUMAN ALBUMIN MICROSPHERES AND PERFLUTREN 9 ML: 10; .22 INJECTION, SOLUTION INTRAVENOUS at 10:30

## 2023-09-11 RX ADMIN — HYDROMORPHONE HYDROCHLORIDE 1 MG: 2 TABLET ORAL at 03:22

## 2023-09-11 RX ADMIN — ACETAMINOPHEN 975 MG: 325 TABLET, FILM COATED ORAL at 22:12

## 2023-09-11 RX ADMIN — FAMOTIDINE 20 MG: 20 TABLET ORAL at 20:46

## 2023-09-11 RX ADMIN — SENNOSIDES AND DOCUSATE SODIUM 1 TABLET: 50; 8.6 TABLET ORAL at 20:46

## 2023-09-11 RX ADMIN — HYDROCHLOROTHIAZIDE 25 MG: 25 TABLET ORAL at 08:29

## 2023-09-11 RX ADMIN — SENNOSIDES AND DOCUSATE SODIUM 1 TABLET: 50; 8.6 TABLET ORAL at 08:29

## 2023-09-11 RX ADMIN — HYDROMORPHONE HYDROCHLORIDE 1 MG: 2 TABLET ORAL at 14:13

## 2023-09-11 RX ADMIN — HYDROMORPHONE HYDROCHLORIDE 1 MG: 2 TABLET ORAL at 20:46

## 2023-09-11 RX ADMIN — BACITRACIN: 500 OINTMENT TOPICAL at 08:34

## 2023-09-11 RX ADMIN — NIFEDIPINE 90 MG: 90 TABLET, FILM COATED, EXTENDED RELEASE ORAL at 10:06

## 2023-09-11 RX ADMIN — HYDROMORPHONE HYDROCHLORIDE 1 MG: 2 TABLET ORAL at 07:19

## 2023-09-11 RX ADMIN — ACETAMINOPHEN 975 MG: 325 TABLET, FILM COATED ORAL at 14:13

## 2023-09-11 RX ADMIN — PRAVASTATIN SODIUM 80 MG: 40 TABLET ORAL at 20:46

## 2023-09-11 RX ADMIN — LOSARTAN POTASSIUM 100 MG: 100 TABLET, FILM COATED ORAL at 08:28

## 2023-09-11 RX ADMIN — BACITRACIN: 500 OINTMENT TOPICAL at 20:55

## 2023-09-11 RX ADMIN — Medication 1 MG: at 20:46

## 2023-09-11 ASSESSMENT — ACTIVITIES OF DAILY LIVING (ADL)
ADLS_ACUITY_SCORE: 34
ADLS_ACUITY_SCORE: 42
ADLS_ACUITY_SCORE: 38
ADLS_ACUITY_SCORE: 34
ADLS_ACUITY_SCORE: 38
ADLS_ACUITY_SCORE: 34
ADLS_ACUITY_SCORE: 42
ADLS_ACUITY_SCORE: 38
ADLS_ACUITY_SCORE: 34
ADLS_ACUITY_SCORE: 38
ADLS_ACUITY_SCORE: 38
ADLS_ACUITY_SCORE: 34
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
## 2023-09-11 NOTE — PLAN OF CARE
Orientations: 4  Vitals/Pain: VSS  on 1L NC except for elevated BP at times  Dilaudid given x1 for pain  Tele: SR with 1st degree AV block and BBB  Lines/Drains: PIV x1 SL  Skin/Wounds: abrasion to R forearm and R knee - dressings changed today  GI/: WDL - no BM today  Ambulation/Assist: x1  Plan: possible discharge to home tomorrow

## 2023-09-11 NOTE — PLAN OF CARE
Goal Outcome Evaluation:       A&O x4 VSS on RA. Lungs sounds clear. Bowel Sounds - normoactive, no BM during shift. Urine Output - adequate. Wounds - Abrasions on rt knee and rt elbow. Ambulation - SBA. Diet - regular Pain controlled by - much more pain today. PRN dilaudid and vasile Tyl given. Plan to do xray tomorrow to see if there is a change in pneumohemothorax.
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
## 2023-09-11 NOTE — PLAN OF CARE
Goal Outcome Evaluation:      Plan of Care Reviewed With: patient    Overall Patient Progress: improvingOverall Patient Progress: improving     Orientations: A&Ox4  Vitals/Pain: VSS on 2L NC. Pt complains of pain relieved with PRN dilaudid and Lidocaine patch in place  Tele: NSR w/1*AVB and BBB  Lines/Drains: 1PIV saline locked  Skin/Wounds: R knee Abrasion CDI, R elbow abrasion dressing changed CDI  GI/: Voiding, No BM on shift, Regular diet  Labs: Abnormal/Trends, Electrolyte Replacement- k 3.4  Ambulation/Assist: A1-2   Sleep Quality: Fair  Plan: Possible pigtail CT pending Chest xray results
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
## 2023-09-12 ENCOUNTER — APPOINTMENT (OUTPATIENT)
Dept: GENERAL RADIOLOGY | Facility: CLINIC | Age: 73
DRG: 200 | End: 2023-09-12
Attending: HOSPITALIST
Payer: COMMERCIAL

## 2023-09-12 VITALS
BODY MASS INDEX: 29.5 KG/M2 | DIASTOLIC BLOOD PRESSURE: 94 MMHG | SYSTOLIC BLOOD PRESSURE: 155 MMHG | HEART RATE: 55 BPM | WEIGHT: 217.81 LBS | HEIGHT: 72 IN | TEMPERATURE: 98.4 F | RESPIRATION RATE: 12 BRPM | OXYGEN SATURATION: 93 %

## 2023-09-12 LAB
ATRIAL RATE - MUSE: 48 BPM
BASE EXCESS BLDV CALC-SCNC: 8.6 MMOL/L (ref -7.7–1.9)
DIASTOLIC BLOOD PRESSURE - MUSE: NORMAL MMHG
HCO3 BLDV-SCNC: 35 MMOL/L (ref 21–28)
INTERPRETATION ECG - MUSE: NORMAL
O2/TOTAL GAS SETTING VFR VENT: 0 %
OXYHGB MFR BLDV: 75 % (ref 70–75)
P AXIS - MUSE: 35 DEGREES
PCO2 BLDV: 51 MM HG (ref 40–50)
PH BLDV: 7.44 [PH] (ref 7.32–7.43)
PO2 BLDV: 41 MM HG (ref 25–47)
PR INTERVAL - MUSE: 198 MS
QRS DURATION - MUSE: 112 MS
QT - MUSE: 462 MS
QTC - MUSE: 412 MS
R AXIS - MUSE: 57 DEGREES
SYSTOLIC BLOOD PRESSURE - MUSE: NORMAL MMHG
T AXIS - MUSE: 91 DEGREES
VENTRICULAR RATE- MUSE: 48 BPM

## 2023-09-12 PROCEDURE — 82805 BLOOD GASES W/O2 SATURATION: CPT | Performed by: HOSPITALIST

## 2023-09-12 PROCEDURE — 250N000013 HC RX MED GY IP 250 OP 250 PS 637: Performed by: INTERNAL MEDICINE

## 2023-09-12 PROCEDURE — 71045 X-RAY EXAM CHEST 1 VIEW: CPT

## 2023-09-12 PROCEDURE — 99239 HOSP IP/OBS DSCHRG MGMT >30: CPT | Performed by: INTERNAL MEDICINE

## 2023-09-12 PROCEDURE — 36415 COLL VENOUS BLD VENIPUNCTURE: CPT | Performed by: HOSPITALIST

## 2023-09-12 PROCEDURE — 250N000013 HC RX MED GY IP 250 OP 250 PS 637: Performed by: HOSPITALIST

## 2023-09-12 RX ADMIN — HYDROMORPHONE HYDROCHLORIDE 1 MG: 2 TABLET ORAL at 04:38

## 2023-09-12 RX ADMIN — LOSARTAN POTASSIUM 100 MG: 100 TABLET, FILM COATED ORAL at 08:05

## 2023-09-12 RX ADMIN — ACETAMINOPHEN 975 MG: 325 TABLET, FILM COATED ORAL at 07:25

## 2023-09-12 RX ADMIN — HYDROCHLOROTHIAZIDE 25 MG: 25 TABLET ORAL at 08:04

## 2023-09-12 RX ADMIN — FAMOTIDINE 20 MG: 20 TABLET ORAL at 08:05

## 2023-09-12 RX ADMIN — SENNOSIDES AND DOCUSATE SODIUM 2 TABLET: 50; 8.6 TABLET ORAL at 08:04

## 2023-09-12 RX ADMIN — NIFEDIPINE 90 MG: 90 TABLET, FILM COATED, EXTENDED RELEASE ORAL at 08:05

## 2023-09-12 RX ADMIN — BACITRACIN: 500 OINTMENT TOPICAL at 08:07

## 2023-09-12 RX ADMIN — HYDROMORPHONE HYDROCHLORIDE 1 MG: 2 TABLET ORAL at 09:56

## 2023-09-12 ASSESSMENT — ACTIVITIES OF DAILY LIVING (ADL)
CONCENTRATING,_REMEMBERING_OR_MAKING_DECISIONS_DIFFICULTY: NO
ADLS_ACUITY_SCORE: 38
DRESSING/BATHING_DIFFICULTY: NO
WALKING_OR_CLIMBING_STAIRS_DIFFICULTY: NO
DOING_ERRANDS_INDEPENDENTLY_DIFFICULTY: NO
ADLS_ACUITY_SCORE: 25
ADLS_ACUITY_SCORE: 38
ADLS_ACUITY_SCORE: 38
TOILETING_ISSUES: NO
DIFFICULTY_EATING/SWALLOWING: NO
ADLS_ACUITY_SCORE: 38
WEAR_GLASSES_OR_BLIND: NO
FALL_HISTORY_WITHIN_LAST_SIX_MONTHS: NO
ADLS_ACUITY_SCORE: 38
CHANGE_IN_FUNCTIONAL_STATUS_SINCE_ONSET_OF_CURRENT_ILLNESS/INJURY: NO
ADLS_ACUITY_SCORE: 38

## 2023-09-12 NOTE — PROGRESS NOTES
Patient has been assessed for Home Oxygen needs. Oxygen readings:    *Pulse oximetry (SpO2) = 70% on room air at rest while sleeping    *SpO2 improved to 97% on 2liters/minute at rest.

## 2023-09-12 NOTE — PROGRESS NOTES
Pt. Alert and oriented x4. Vital signs stable on 1L, unable to remove oxygen as pt would desat when sleeping. Assist of 1. Tolerating regular diet. Lung sounds diminished. Bowel sounds +, No BM, adequate urine output. Abrasions to R forearm and knee, wound care completed. Pain managed with dilauded x2. Denies nausea. Tele SR/SB w/ BBB. Possible discharge home today.
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
## 2023-09-12 NOTE — PROVIDER NOTIFICATION
MD Notification    Notified Person: MD    Notified Person Name: Rajan Aj    Notification Date/Time: 9/12/23 @1006    Notification Interaction: vocera    Purpose of Notification: FYI - Patient required 1-2L NC while sleeping last night to stay >90% and does not have oxygen at home    Orders Received: home O2 ordered for discharge    Comments:

## 2024-09-01 ENCOUNTER — HEALTH MAINTENANCE LETTER (OUTPATIENT)
Age: 74
End: 2024-09-01